# Patient Record
Sex: MALE | Race: WHITE | NOT HISPANIC OR LATINO | Employment: FULL TIME | URBAN - METROPOLITAN AREA
[De-identification: names, ages, dates, MRNs, and addresses within clinical notes are randomized per-mention and may not be internally consistent; named-entity substitution may affect disease eponyms.]

---

## 2019-08-14 ENCOUNTER — TELEPHONE (OUTPATIENT)
Dept: FAMILY MEDICINE CLINIC | Facility: CLINIC | Age: 39
End: 2019-08-14

## 2019-08-14 ENCOUNTER — OFFICE VISIT (OUTPATIENT)
Dept: FAMILY MEDICINE CLINIC | Facility: CLINIC | Age: 39
End: 2019-08-14
Payer: COMMERCIAL

## 2019-08-14 VITALS
OXYGEN SATURATION: 97 % | BODY MASS INDEX: 35.49 KG/M2 | HEIGHT: 76 IN | WEIGHT: 291.4 LBS | HEART RATE: 86 BPM | SYSTOLIC BLOOD PRESSURE: 138 MMHG | TEMPERATURE: 97.1 F | DIASTOLIC BLOOD PRESSURE: 80 MMHG | RESPIRATION RATE: 16 BRPM

## 2019-08-14 DIAGNOSIS — B35.4 TINEA CORPORIS: Primary | ICD-10-CM

## 2019-08-14 PROCEDURE — 99203 OFFICE O/P NEW LOW 30 MIN: CPT | Performed by: PHYSICIAN ASSISTANT

## 2019-08-14 PROCEDURE — 3008F BODY MASS INDEX DOCD: CPT | Performed by: PHYSICIAN ASSISTANT

## 2019-08-14 PROCEDURE — 1036F TOBACCO NON-USER: CPT | Performed by: PHYSICIAN ASSISTANT

## 2019-08-14 RX ORDER — GRISEOFULVIN 500 MG/1
500 TABLET ORAL DAILY
Qty: 14 TABLET | Refills: 0 | Status: SHIPPED | OUTPATIENT
Start: 2019-08-14 | End: 2019-08-28

## 2019-08-14 NOTE — PROGRESS NOTES
Assessment/Plan:    1  Tinea corporis  Discussed treatment options  Patient was agreeable to griseofulvin  Will start with 2 week course twice daily  May need 4 week treatment  Discussed getting a CMP at start of treatment and after  Patient agreeable  Discussed possible side effects  Discussed skin care and proper hygiene  Patient refused all other lab work at this time  - comprehensive metabolic panel; Future  - griseofulvin (GRIFULVIN V) 500 MG tablet; Take 1 tablet (500 mg total) by mouth daily for 14 days  Dispense: 14 tablet; Refill: 0    Addendum:  Patient called in after visit and stated griseofulvin was not stock at local pharmacy  He requested an alternative  We will switch to terbinafine once daily for 4 weeks  Discussed that he still needs to get his lab work done at started treatment and after  Discussed possible side effects  Will recheck in 1 month  Follow up sooner if needed  No problem-specific Assessment & Plan notes found for this encounter  There is no problem list on file for this patient  Subjective:      Patient ID: Terry Cage is a 35 y o  male  Patient is here to establish care  Previously did not have a PCP  He was recently seen at the urgent care for a rash  States that has been present for over 2 weeks  He was seen on 08/07 at [facility]  He was told to use over-the-counter Lotrimin as it was ringworm  He states it did not improve  He requested a oral medication  They have refused and said he should see his PCP  States it started on his neck  It then spread to his arms chest and trunk  Denies contacts with similar symptoms  States it is mildly itchy  Denies pain  Denies pustules, blisters, or weeping  Denies fever  Denies cold or flu-like symptoms  Denies any ongoing medical issues  He does not take medication for anything        The following portions of the patient's history were reviewed and updated as appropriate: allergies, current medications, past family history, past medical history, past social history, past surgical history and problem list     Review of Systems   Constitutional: Negative for appetite change, chills, fatigue and fever  HENT: Negative for congestion, ear pain, postnasal drip, rhinorrhea, sinus pressure, sinus pain and sore throat  Respiratory: Negative for cough, chest tightness, shortness of breath and wheezing  Cardiovascular: Negative for chest pain, palpitations and leg swelling  Gastrointestinal: Negative for abdominal pain, constipation, diarrhea, nausea and vomiting  Endocrine: Negative for cold intolerance, heat intolerance, polydipsia, polyphagia and polyuria  Musculoskeletal: Negative for arthralgias, back pain, gait problem, joint swelling and myalgias  Skin: Positive for rash  Neurological: Negative for dizziness, weakness, light-headedness, numbness and headaches  Hematological: Negative for adenopathy  Objective:      /80 (BP Location: Left arm, Patient Position: Sitting, Cuff Size: Large)   Pulse 86   Temp (!) 97 1 °F (36 2 °C) (Tympanic)   Resp 16   Ht 6' 4" (1 93 m)   Wt 132 kg (291 lb 6 4 oz)   SpO2 97%   BMI 35 47 kg/m²          Physical Exam   Constitutional: He is oriented to person, place, and time  He appears well-developed and well-nourished  HENT:   Head: Normocephalic and atraumatic  Eyes: Pupils are equal, round, and reactive to light  Conjunctivae are normal    Neck: Normal range of motion  Neck supple  Cardiovascular: Normal rate, regular rhythm, S1 normal, S2 normal, normal heart sounds, intact distal pulses and normal pulses  Pulmonary/Chest: Effort normal and breath sounds normal    Abdominal: Soft  Bowel sounds are normal    Musculoskeletal: Normal range of motion  Neurological: He is alert and oriented to person, place, and time  Skin: Skin is warm and dry  Rash noted     Well demarcated circular erythematous plaques with central clearing spread throughout back, trunk, arms, and neck bilateral   Base of hairline and scalp as well  Psychiatric: He has a normal mood and affect  His behavior is normal  Judgment and thought content normal    Nursing note and vitals reviewed  No current outpatient medications on file prior to visit  No current facility-administered medications on file prior to visit

## 2019-08-14 NOTE — TELEPHONE ENCOUNTER
Pt called L/M stating his medication is rare and needs a substitute medication his pharmacy does not have this in stock  Thank you!

## 2019-08-15 NOTE — TELEPHONE ENCOUNTER
The medication is not rare, the pharmacy just does not carry it  He can either call other pharmacies in the area to see who stocks it, or I can switch him to terbenafine  This is still a month long treatment and he still needs to get the labs done as soon as possible   Thank you

## 2019-08-15 NOTE — TELEPHONE ENCOUNTER
I s/w pt and he stated to me he will look around to see if there is another pharmacy that carries griseofulvin 500 mg  He will give us a call back to have it changed if he is unsuccessful  Thank you!

## 2020-01-29 RX ORDER — METRONIDAZOLE 250 MG/1
TABLET ORAL EVERY 8 HOURS SCHEDULED
COMMUNITY

## 2020-01-29 NOTE — PRE-PROCEDURE INSTRUCTIONS
No outpatient medications have been marked as taking for the 1/31/20 encounter [facility] Encounter)

## 2020-01-30 ENCOUNTER — ANESTHESIA EVENT (OUTPATIENT)
Dept: PERIOP | Facility: HOSPITAL | Age: 40
End: 2020-01-30
Payer: COMMERCIAL

## 2020-01-30 NOTE — ANESTHESIA PREPROCEDURE EVALUATION
Review of Systems/Medical History  Patient summary reviewed  Chart reviewed      Cardiovascular  Negative cardio ROS    Pulmonary  Negative pulmonary ROS        GI/Hepatic  Negative GI/hepatic ROS          Negative  ROS        Endo/Other  Negative endo/other ROS      GYN       Hematology  Negative hematology ROS      Musculoskeletal  Negative musculoskeletal ROS        Neurology  Negative neurology ROS      Psychology   Negative psychology ROS              Physical Exam    Airway    Mallampati score: II  TM Distance: >3 FB  Neck ROM: full     Dental       Cardiovascular  Comment: Negative ROS, Cardiovascular exam normal    Pulmonary  Pulmonary exam normal     Other Findings        Anesthesia Plan  ASA Score- 1     Anesthesia Type- IV sedation with anesthesia and general with ASA Monitors  Additional Monitors:   Airway Plan: ETT  Plan Factors-    Induction- intravenous  Postoperative Plan-     Informed Consent- Anesthetic plan and risks discussed with patient  I personally reviewed this patient with the CRNA  Discussed and agreed on the Anesthesia Plan with the CRNA  Keren Carlson

## 2020-01-31 ENCOUNTER — ANESTHESIA (OUTPATIENT)
Dept: PERIOP | Facility: HOSPITAL | Age: 40
End: 2020-01-31
Payer: COMMERCIAL

## 2020-01-31 ENCOUNTER — HOSPITAL ENCOUNTER (OUTPATIENT)
Facility: HOSPITAL | Age: 40
Setting detail: OUTPATIENT SURGERY
Discharge: HOME | End: 2020-01-31
Attending: COLON & RECTAL SURGERY | Admitting: COLON & RECTAL SURGERY
Payer: COMMERCIAL

## 2020-01-31 VITALS
RESPIRATION RATE: 16 BRPM | SYSTOLIC BLOOD PRESSURE: 128 MMHG | HEART RATE: 85 BPM | TEMPERATURE: 97.5 F | DIASTOLIC BLOOD PRESSURE: 93 MMHG | OXYGEN SATURATION: 94 %

## 2020-01-31 DIAGNOSIS — K61.0 ANAL ABSCESS: Primary | ICD-10-CM

## 2020-01-31 RX ORDER — SODIUM CHLORIDE, SODIUM LACTATE, POTASSIUM CHLORIDE, CALCIUM CHLORIDE 600; 310; 30; 20 MG/100ML; MG/100ML; MG/100ML; MG/100ML
125 INJECTION, SOLUTION INTRAVENOUS CONTINUOUS
Status: DISCONTINUED | OUTPATIENT
Start: 2020-01-31 | End: 2020-01-31 | Stop reason: HOSPADM

## 2020-01-31 RX ORDER — SUCCINYLCHOLINE/SOD CL,ISO/PF 100 MG/5ML
SYRINGE (ML) INTRAVENOUS AS NEEDED
Status: DISCONTINUED | OUTPATIENT
Start: 2020-01-31 | End: 2020-01-31 | Stop reason: SURG

## 2020-01-31 RX ORDER — MAGNESIUM HYDROXIDE 1200 MG/15ML
LIQUID ORAL AS NEEDED
Status: DISCONTINUED | OUTPATIENT
Start: 2020-01-31 | End: 2020-01-31 | Stop reason: HOSPADM

## 2020-01-31 RX ORDER — FENTANYL CITRATE/PF 50 MCG/ML
25 SYRINGE (ML) INJECTION
Status: DISCONTINUED | OUTPATIENT
Start: 2020-01-31 | End: 2020-01-31 | Stop reason: HOSPADM

## 2020-01-31 RX ORDER — DIPHENHYDRAMINE HYDROCHLORIDE 50 MG/ML
12.5 INJECTION INTRAMUSCULAR; INTRAVENOUS ONCE AS NEEDED
Status: DISCONTINUED | OUTPATIENT
Start: 2020-01-31 | End: 2020-01-31 | Stop reason: HOSPADM

## 2020-01-31 RX ORDER — HYDROMORPHONE HCL/PF 1 MG/ML
0.5 SYRINGE (ML) INJECTION
Status: DISCONTINUED | OUTPATIENT
Start: 2020-01-31 | End: 2020-01-31 | Stop reason: HOSPADM

## 2020-01-31 RX ORDER — OXYCODONE HYDROCHLORIDE AND ACETAMINOPHEN 5; 325 MG/1; MG/1
1 TABLET ORAL EVERY 4 HOURS PRN
Status: DISCONTINUED | OUTPATIENT
Start: 2020-01-31 | End: 2020-01-31 | Stop reason: HOSPADM

## 2020-01-31 RX ORDER — FENTANYL CITRATE 50 UG/ML
INJECTION, SOLUTION INTRAMUSCULAR; INTRAVENOUS AS NEEDED
Status: DISCONTINUED | OUTPATIENT
Start: 2020-01-31 | End: 2020-01-31 | Stop reason: SURG

## 2020-01-31 RX ORDER — LIDOCAINE HYDROCHLORIDE 10 MG/ML
INJECTION, SOLUTION EPIDURAL; INFILTRATION; INTRACAUDAL; PERINEURAL AS NEEDED
Status: DISCONTINUED | OUTPATIENT
Start: 2020-01-31 | End: 2020-01-31 | Stop reason: SURG

## 2020-01-31 RX ORDER — PROPOFOL 10 MG/ML
INJECTION, EMULSION INTRAVENOUS AS NEEDED
Status: DISCONTINUED | OUTPATIENT
Start: 2020-01-31 | End: 2020-01-31 | Stop reason: SURG

## 2020-01-31 RX ORDER — OXYCODONE HYDROCHLORIDE 5 MG/1
5 TABLET ORAL EVERY 4 HOURS PRN
Qty: 30 TABLET | Refills: 0 | Status: SHIPPED | OUTPATIENT
Start: 2020-01-31 | End: 2020-02-10

## 2020-01-31 RX ADMIN — SODIUM CHLORIDE, SODIUM LACTATE, POTASSIUM CHLORIDE, AND CALCIUM CHLORIDE: .6; .31; .03; .02 INJECTION, SOLUTION INTRAVENOUS at 12:20

## 2020-01-31 RX ADMIN — PROPOFOL 250 MG: 10 INJECTION, EMULSION INTRAVENOUS at 12:25

## 2020-01-31 RX ADMIN — Medication 140 MG: at 12:25

## 2020-01-31 RX ADMIN — LIDOCAINE HYDROCHLORIDE 50 MG: 10 INJECTION, SOLUTION EPIDURAL; INFILTRATION; INTRACAUDAL; PERINEURAL at 12:25

## 2020-01-31 RX ADMIN — PROPOFOL 50 MG: 10 INJECTION, EMULSION INTRAVENOUS at 12:45

## 2020-01-31 RX ADMIN — FENTANYL CITRATE 50 MCG: 50 INJECTION INTRAMUSCULAR; INTRAVENOUS at 12:25

## 2020-01-31 RX ADMIN — FENTANYL CITRATE 50 MCG: 50 INJECTION INTRAMUSCULAR; INTRAVENOUS at 12:23

## 2020-01-31 NOTE — PERIOPERATIVE NURSING NOTE
Returned from [facility] awake and alert c/o slight pain  ivs running  Sipping water  Dressing dry and intact

## 2020-01-31 NOTE — OP NOTE
OPERATIVE REPORT  PATIENT NAME: Sury Olguin    :  1980  MRN: [id number]  Pt Location:  OR ROOM 08    SURGERY DATE: 2020    Preoperative Dx:  History of anal abscess    Postoperative Dx:  Same    Procedure:  Exam under anesthesia, incision and drainage of intersphincteric abscess  Surgeon: Dr Fernandez Rice MD    First Assistant: None    Findings:  Intersphincteric abscess in the left anterior position    Specimen(s): * No specimens in log *    Anesthesia Type: General    EBL: Minimal    Complications: None      Procedure: The patient was brought into the operating room  General anesthesia was administered  He was transferred onto the table in a prone nubia-knife position  The buttocks were retracted with cloth tape  The area was prepped and draped  The patient was given an anal block with lidocaine Marcaine epinephrine and bicarbonate  I placed a Sorenson retractors  The abscess previously was not clearly visualized but there was pus draining from the left anterior position  On feeling here with my finger I could feel induration in the left anterior position  I took a crypt hook placing it in the crypts identifying a crypt in the left anterior position entering into a small abscess cavity space  I used electrocautery to cut down through the mucosa and the internal sphincter muscle getting to the small abscess cavity  There was granulation tissue  I used a curette to curette this area  The entire wound probably measured 1 cm in size only as we entered into the space  At the end of the case there was good hemostasis  I felt that the abscess was adequately drained  A small piece of Surgicel was placed  The patient was woken transported recovery room in stable condition                Attestation: I was present for the entire procedure        Patient Disposition: PACU      SIGNATURE: Fernandez Rice MD  DATE: 2020  TIME: 12:51 PM

## 2020-01-31 NOTE — DISCHARGE SUMMARY
[facility]                                                                                 DISCHARGE SUMMARY        PATIENT NAME: Tiff Castro    :  1980  MRN: [id number]  Pt Location: [facility] OR ROOM 08    DISCHARGE DATE: 2020    PROCEDURE: Procedure(s) (LRB):  DRAINAGE OF INTERSPHINTERIC ABCESS (N/A)    Anesthesia Type: General    Complications: None    Specimen(s):  * No specimens in log *    HOSPITAL COURSE: The patient was admitted for elective procedure  The procedure was uncomplicated and the the patient was discharged home post procedure          SIGNATURE: Gaston Moya MD  DATE: 2020  TIME: 12:56 PM

## 2020-01-31 NOTE — INTERVAL H&P NOTE
H&P reviewed  After examining the patient I find no changes in the patients condition since the H&P had been written      Vitals:    01/31/20 1048   BP: 170/91   Pulse: 87   Resp: 16   Temp: 98 °F (36 7 °C)   SpO2: 98%

## 2021-04-14 ENCOUNTER — OFFICE VISIT (OUTPATIENT)
Dept: PODIATRY | Facility: CLINIC | Age: 41
End: 2021-04-14
Payer: COMMERCIAL

## 2021-04-14 VITALS
DIASTOLIC BLOOD PRESSURE: 88 MMHG | HEIGHT: 77 IN | WEIGHT: 308.6 LBS | SYSTOLIC BLOOD PRESSURE: 130 MMHG | BODY MASS INDEX: 36.44 KG/M2

## 2021-04-14 DIAGNOSIS — M72.2 PLANTAR FASCIITIS: ICD-10-CM

## 2021-04-14 DIAGNOSIS — M77.42 METATARSALGIA OF LEFT FOOT: ICD-10-CM

## 2021-04-14 DIAGNOSIS — M77.41 METATARSALGIA OF RIGHT FOOT: Primary | ICD-10-CM

## 2021-04-14 PROCEDURE — 3008F BODY MASS INDEX DOCD: CPT | Performed by: PODIATRIST

## 2021-04-14 PROCEDURE — 99202 OFFICE O/P NEW SF 15 MIN: CPT | Performed by: PODIATRIST

## 2021-04-14 PROCEDURE — 1036F TOBACCO NON-USER: CPT | Performed by: PODIATRIST

## 2021-04-14 RX ORDER — MELOXICAM 15 MG/1
15 TABLET ORAL DAILY
Qty: 30 TABLET | Refills: 0 | Status: SHIPPED | OUTPATIENT
Start: 2021-04-14 | End: 2021-05-14

## 2021-04-15 NOTE — PROGRESS NOTES
Assessment/Plan:    Explained the patient that he is dealing with metatarsalgia bilateral along with plantar fasciitis of the left arch secondary to overuse  Patient wears a size 13 shoe an over-the-counter supports are not advised  Also did not recommended orthotics due to his past failure of inserts in his shoes  Patient placed on meloxicam 15 mg daily  He will be rescheduled in 4 weeks  No problem-specific Assessment & Plan notes found for this encounter  Diagnoses and all orders for this visit:    Metatarsalgia of right foot  -     meloxicam (MOBIC) 15 mg tablet; Take 1 tablet (15 mg total) by mouth daily    Metatarsalgia of left foot  -     meloxicam (MOBIC) 15 mg tablet; Take 1 tablet (15 mg total) by mouth daily    Plantar fasciitis  -     meloxicam (MOBIC) 15 mg tablet; Take 1 tablet (15 mg total) by mouth daily          Subjective:      Patient ID: Mehdi Kelly is a 42 y o  male  HPI     Patient, a 26-year-old male presents with bilateral foot pain  Patient states that he has been in pain for over 1 year  The pain increases the more that the patient is standing or working  He states that he is a  and is on his feet throughout most of the day  Patient does not have significant pain when he is nonweightbearing  The pain is primarily in the balls of both feet and in the left arch  He states that his toes also feel numb  He has no difficulty sleeping however  No specific trauma is noted  He states that if he takes 2 Aleve he feels more comfortable  He states that years back he had inserts prescribed for his shoes but that they were uncomfortable  He relates having had plantar fasciitis which caused him heel pain in the past   There is no heel pain today      The following portions of the patient's history were reviewed and updated as appropriate: allergies, current medications, past family history, past medical history, past social history, past surgical history and problem list     Review of Systems   Constitutional: Negative  Respiratory: Negative  Cardiovascular: Negative  Gastrointestinal: Negative  Neurological: Positive for numbness  Objective:      /88   Ht 6' 5" (1 956 m)   Wt (!) 140 kg (308 lb 9 6 oz)   BMI 36 59 kg/m²          Physical Exam  Constitutional:       Appearance: Normal appearance  Cardiovascular:      Pulses: Normal pulses  Musculoskeletal:         General: No swelling, tenderness or deformity  Comments: No pain with palpation of either heel  No pain with palpation lesser metatarsals  No evidence of significant pes planus or pes cavus  Skin:     General: Skin is warm  Neurological:      General: No focal deficit present  Mental Status: He is oriented to person, place, and time        Comments: No pain with palpation 2nd and 3rd metatarsal interspaces bilateral

## 2021-05-12 ENCOUNTER — OFFICE VISIT (OUTPATIENT)
Dept: PODIATRY | Facility: CLINIC | Age: 41
End: 2021-05-12
Payer: COMMERCIAL

## 2021-05-12 VITALS
BODY MASS INDEX: 36.11 KG/M2 | DIASTOLIC BLOOD PRESSURE: 72 MMHG | HEIGHT: 77 IN | SYSTOLIC BLOOD PRESSURE: 120 MMHG | WEIGHT: 305.8 LBS

## 2021-05-12 DIAGNOSIS — M77.42 METATARSALGIA OF LEFT FOOT: ICD-10-CM

## 2021-05-12 DIAGNOSIS — M77.41 METATARSALGIA OF RIGHT FOOT: Primary | ICD-10-CM

## 2021-05-12 PROCEDURE — 99212 OFFICE O/P EST SF 10 MIN: CPT | Performed by: PODIATRIST

## 2021-05-12 PROCEDURE — 3008F BODY MASS INDEX DOCD: CPT | Performed by: PODIATRIST

## 2021-05-12 PROCEDURE — 1036F TOBACCO NON-USER: CPT | Performed by: PODIATRIST

## 2021-05-12 RX ORDER — MELOXICAM 15 MG/1
15 TABLET ORAL DAILY
Qty: 90 TABLET | Refills: 0 | Status: SHIPPED | OUTPATIENT
Start: 2021-05-12 | End: 2021-08-10

## 2021-05-12 NOTE — PROGRESS NOTES
Patient presents for pedal assessment  He has been taking meloxicam for the past 1 month without adverse effects  Patient notes improvement in both feet although he still has discomfort  He desires though to continue with this medication  A 90 day supply was provided  Patient will be rescheduled in 3 months

## 2021-06-23 ENCOUNTER — OFFICE VISIT (OUTPATIENT)
Dept: URGENT CARE | Facility: CLINIC | Age: 41
End: 2021-06-23
Payer: COMMERCIAL

## 2021-06-23 VITALS
TEMPERATURE: 97.2 F | RESPIRATION RATE: 18 BRPM | DIASTOLIC BLOOD PRESSURE: 100 MMHG | SYSTOLIC BLOOD PRESSURE: 153 MMHG | OXYGEN SATURATION: 98 % | HEART RATE: 90 BPM
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
## 2021-06-23 DIAGNOSIS — S80.862A INSECT BITE OF LEFT LOWER LEG, INITIAL ENCOUNTER: Primary | ICD-10-CM

## 2021-06-23 DIAGNOSIS — L03.116 CELLULITIS OF LEFT LOWER LEG: ICD-10-CM

## 2021-06-23 PROCEDURE — G0382 LEV 3 HOSP TYPE B ED VISIT: HCPCS | Performed by: PHYSICIAN ASSISTANT

## 2021-06-23 RX ORDER — CEPHALEXIN 500 MG/1
500 CAPSULE ORAL EVERY 8 HOURS SCHEDULED
Qty: 21 CAPSULE | Refills: 0 | Status: SHIPPED | COMMUNITY
Start: 2021-06-23 | End: 2021-06-30

## 2021-06-23 RX ORDER — CEPHALEXIN 500 MG/1
500 CAPSULE ORAL EVERY 8 HOURS SCHEDULED
Qty: 21 CAPSULE | Refills: 0 | Status: SHIPPED | OUTPATIENT
Start: 2021-06-23 | End: 2021-06-23 | Stop reason: CLARIF

## 2021-06-23 NOTE — PATIENT INSTRUCTIONS
Take photos of lesions when you get home to document  Clean areas daily with soap and water  Do not use alcohol or peroxide  Take antibiotic as instructed  If not improving over the next 3-5 days would recommend further evaluation with your primary care provider

## 2021-06-23 NOTE — PROGRESS NOTES
[facility]    NAME: Rafael Wells is a 42 y o  male  : 1980    MRN: [id number]  DATE: 2021  TIME: 5:55 PM    Assessment and Plan   Insect bite of left lower leg, initial encounter [H29 958D, W57  [id number]]  1  Insect bite of left lower leg, initial encounter     2  Cellulitis of left lower leg  cephalexin (KEFLEX) 500 mg capsule    DISCONTINUED: cephalexin (KEFLEX) 500 mg capsule       Patient Instructions   Patient Instructions   Take photos of lesions when you get home to document  Clean areas daily with soap and water  Do not use alcohol or peroxide  Take antibiotic as instructed  If not improving over the next 3-5 days would recommend further evaluation with your primary care provider  Chief Complaint     Chief Complaint   Patient presents with    Skin Problem     Left lower leg with 2 bites and redness and swelling for 2 days       History of Present Illness   Rafael Wells presents to the clinic c/o    25-year-old male that notice some sore areas on his lower leg about 3 days ago  They came to a head and he scrubbed them and squeezed at them and they are becoming more painful  Unaware of any specific bug bites but they do appear to be bug bites  Denies any known tick exposure  No fever or chills  Review of Systems   Review of Systems   Constitutional: Negative  Skin: Positive for color change and wound         Current Medications     Long-Term Medications   Medication Sig Dispense Refill    meloxicam (MOBIC) 15 mg tablet Take 1 tablet (15 mg total) by mouth daily 90 tablet 0    meloxicam (MOBIC) 15 mg tablet Take 1 tablet (15 mg total) by mouth daily 30 tablet 0       Current Allergies     Allergies as of 2021    (No Known Allergies)          The following portions of the patient's history were reviewed and updated as appropriate: allergies, current medications, past family history, past medical history, past social history, past surgical history and problem list   Past Medical History:   Diagnosis Date    Perianal abscess      Past Surgical History:   Procedure Laterality Date    FACIAL RECONSTRUCTION SURGERY      NO PAST SURGERIES      NH REMOVAL ANAL FISTULA,SUBCUTANEOUS N/A 1/31/2020    Procedure: DRAINAGE OF INTERSPHINTERIC ABCESS;  Surgeon: Kimani Tamayo MD;  Location: [facility] MAIN OR;  Service: Colorectal     Family History   Problem Relation Age of Onset    Diabetes Mother     Hypertension Father        Objective   /100   Pulse 90   Temp (!) 97 2 °F (36 2 °C) (Tympanic)   Resp 18   SpO2 98%   No LMP for male patient  Physical Exam     Physical Exam  Vitals and nursing note reviewed  Constitutional:       General: He is not in acute distress  Appearance: Normal appearance  He is well-developed  He is not ill-appearing, toxic-appearing or diaphoretic  Cardiovascular:      Rate and Rhythm: Normal rate and regular rhythm  Pulmonary:      Effort: Pulmonary effort is normal    Skin:     General: Skin is warm and dry  Comments: Left lower leg in gator area with 2 distinct red swollen indurated lesions with central ulceration appear consistent with some type of insect bite  Localized TTP  1 lesion is 3 cm in diameter while the other 1 is approximately 2 cm in diameter  Neurological:      Mental Status: He is alert and oriented to person, place, and time     Psychiatric:         Mood and Affect: Mood normal          Behavior: Behavior normal

## 2021-10-20 ENCOUNTER — TELEPHONE (OUTPATIENT)
Dept: FAMILY MEDICINE CLINIC | Facility: CLINIC | Age: 41
End: 2021-10-20

## 2021-10-20 NOTE — TELEPHONE ENCOUNTER
Pt's wife LM stating last week he had a broken blood vessel in his eye which then cleared up but this morning woke up with broken blood vessel in the opposite eye  She would like to know if he should schedule an appt in our office or with an eye doctor

## 2021-10-20 NOTE — TELEPHONE ENCOUNTER
Lmom with Dr Kobe Kirkpatrick message about scheduling with opthomalogy  Asked wife to call back to confirm she got message and to cancel appt for thurs 10/21 at 8am

## 2022-11-12 ENCOUNTER — TELEPHONE (OUTPATIENT)
Dept: OTHER | Facility: OTHER | Age: 42
End: 2022-11-12

## 2022-11-12 NOTE — TELEPHONE ENCOUNTER
Call from patient wife, Neelima Ellis, requesting call back for appointment today as patient is feeling stressful

## 2022-11-16 ENCOUNTER — OFFICE VISIT (OUTPATIENT)
Dept: FAMILY MEDICINE CLINIC | Facility: CLINIC | Age: 42
End: 2022-11-16

## 2022-11-16 VITALS
DIASTOLIC BLOOD PRESSURE: 100 MMHG | TEMPERATURE: 99 F | HEART RATE: 88 BPM | WEIGHT: 289 LBS | BODY MASS INDEX: 35.93 KG/M2 | HEIGHT: 75 IN | SYSTOLIC BLOOD PRESSURE: 146 MMHG | OXYGEN SATURATION: 98 %

## 2022-11-16 DIAGNOSIS — Z13.220 SCREENING FOR LIPID DISORDERS: ICD-10-CM

## 2022-11-16 DIAGNOSIS — F41.1 ANXIETY IN ACUTE STRESS REACTION: Primary | ICD-10-CM

## 2022-11-16 DIAGNOSIS — R03.0 ELEVATED BLOOD PRESSURE READING: ICD-10-CM

## 2022-11-16 DIAGNOSIS — K42.9 UMBILICAL HERNIA WITHOUT OBSTRUCTION AND WITHOUT GANGRENE: ICD-10-CM

## 2022-11-16 DIAGNOSIS — G62.9 NEUROPATHY: ICD-10-CM

## 2022-11-16 DIAGNOSIS — Z11.59 NEED FOR HEPATITIS C SCREENING TEST: ICD-10-CM

## 2022-11-16 DIAGNOSIS — F43.0 ANXIETY IN ACUTE STRESS REACTION: Primary | ICD-10-CM

## 2022-11-16 DIAGNOSIS — Z13.31 POSITIVE DEPRESSION SCREENING: ICD-10-CM

## 2022-11-16 DIAGNOSIS — Z11.4 SCREENING FOR HIV (HUMAN IMMUNODEFICIENCY VIRUS): ICD-10-CM

## 2022-11-16 DIAGNOSIS — Z13.1 SCREENING FOR DIABETES MELLITUS: ICD-10-CM

## 2022-11-16 DIAGNOSIS — R82.998 DARK URINE: ICD-10-CM

## 2022-11-16 DIAGNOSIS — Z23 NEED FOR VACCINATION: ICD-10-CM

## 2022-11-16 PROBLEM — A69.23 LYME ARTHRITIS (HCC): Status: ACTIVE | Noted: 2022-11-06

## 2022-11-16 NOTE — PATIENT INSTRUCTIONS
Purchase BP cuff (large size)  Check your daily blood pressure and keep a log of the readings for the next 2 weeks  Follow up in the office in 2 weeks

## 2022-11-16 NOTE — PROGRESS NOTES
Assessment/Plan:    1  Anxiety in acute stress reaction  Assessment & Plan:  Patient with acute anxiety due to stress regarding his job  Addressed self-care methods for lessening anxiety such as decreased caffeine intake, healthy diet, stress management, and relaxation techniques  Patient was counseled briefly  He has support from his spouse  Medication option was discussed which patient deferred at this time  Patient will follow up in 2 weeks  2  Positive depression screening  Assessment & Plan:  Depression Screening Follow-up Plan: Patient's depression screening was positive with a PHQ-2 score of 4  Their PHQ-9 score was 13  Patient assessed for underlying major depression  They have no active suicidal ideations  Brief counseling provided and recommend additional follow-up/re-evaluation next office visit  3  Neuropathy  Assessment & Plan:  Patient with decreased sensation in multiple areas of feet with monofilament test   We discussed possible causes and will obtain blood work for further evaluation  Orders:  -     CBC and differential; Future; Expected date: 11/16/2022  -     Comprehensive metabolic panel; Future; Expected date: 11/16/2022  -     Hemoglobin A1C; Future; Expected date: 11/16/2022  -     Vitamin B12; Future; Expected date: 11/16/2022    4  Elevated blood pressure reading  Assessment & Plan:  Patient with elevated BP without history of HTN  We discussed importance of BP control and lifestyle modifications including weight loss, diet change and exercise  Patient instructed to purchase a blood pressure cuff and start checking daily BP readings  He was instructed to follow up in the office in 2 weeks to recheck BP  Obtain blood work which was ordered  Orders:  -     CBC and differential; Future; Expected date: 11/16/2022  -     Comprehensive metabolic panel; Future; Expected date: 11/16/2022  -     TSH, 3rd generation with Free T4 reflex;  Future; Expected date: 11/16/2022    5  Dark urine  Assessment & Plan:  Patient with episode of dark urine  No other urinary symptoms  Patient likely dehydrated and instructed to increase water intake  However, we will rule out UTI  UA ordered  Orders:  -     UA (URINE) with reflex to Scope    6  Umbilical hernia without obstruction and without gangrene  Assessment & Plan:  Patient with umbilical hernia that is not causing discomfort  Hernia is reducible on exam     We discussed treatment options and patient agrees to defer management at this time  Consider referral to surgery if patient develops pain  We discussed signs and symptoms of hernia obstruction and strangulation  7  Need for hepatitis C screening test  -     Hepatitis C Antibody ([facility]); Future    8  Screening for lipid disorders  -     Lipid Panel with Direct LDL reflex; Future; Expected date: 11/16/2022    9  Screening for diabetes mellitus  -     Hemoglobin A1C; Future; Expected date: 11/16/2022    10  Screening for HIV (human immunodeficiency virus)  -     HIV 1/2 Antigen/Antibody (4th Generation) w Reflex [facility]; Future; Expected date: 11/16/2022    11  Need for vaccination  -     TDAP VACCINE GREATER THAN OR EQUAL TO 8YO IM    BMI Counseling: Body mass index is 36 12 kg/m²  The BMI is above normal  Nutrition recommendations include reducing portion sizes, 3-5 servings of fruits/vegetables daily, consuming healthier snacks, decreasing soda and/or juice intake and moderation in carbohydrate intake  Exercise recommendations include moderate aerobic physical activity for 150 minutes/week and exercising 3-5 times per week  Depression Screening Follow-up Plan: Patient's depression screening was positive with a PHQ-2 score of 4  Their PHQ-9 score was 13  Patient assessed for underlying major depression  They have no active suicidal ideations  Brief counseling provided and recommend additional follow-up/re-evaluation next office visit    Subjective: Patient ID: Trey Kilgore is a 36 y o  male  HPI    Patient presenting to establish care  He is here with his wife today and has multiple concerns  Patient has numbness in the bottom of both of his feet that has been rpesent for several years  He feels there is numbness in his toes  By the end of the day he has to put his feet up after work due to discomfort in his feet  He works as a  and is on his feet all day  Patient had an episode of dark urine today  Patient denies urinary symptoms, nausea, vomiting, chest pain, shortness of breath  Patient eats three meals per day and snacks late at night  He eats chips, nachos, juice, ice cream   This morning he had a half a bagel, a small bag of doritos, spaghetti and meat sauce for lunch, and has not eaten dinner yet  He admits that he drinks a lot of juice and soda  His wife cooks most of the meals at home  Patient has an umbilical hernia that has been present for several yaers  He has occasional pain in the area but mild and it does not occur frequently  Patient lives with his wife and two children, ages 15 and 16  He also has a pet dog  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 and he has been anxious lately  His wife has noticed decreased appetite and low mood  He found out about it last Monday  He has been at this company for 8 years and feels that he has to leave the company  He already is applying to a different job and has interview next week  Patient's wife has noticed him not sleeping well  He told her that he feels like he is a failure  Patient denies history of depression and anxiety  He is usually in a good mood most days  This is a new emotion for him to experience and he understands that it is due to this recent stress regarding his job  He does not wish to start medication for his mood at this time  Patient was recently diagnosed with lyme arthritis and was given 21 days of doxycucline         The following portions of the patient's history were reviewed and updated as appropriate: allergies, current medications, past family history, past medical history, past social history, past surgical history, and problem list     No current outpatient medications on file  Review of Systems   Constitutional: Negative for chills and fever  HENT: Negative for ear pain and sore throat  Eyes: Negative for pain and visual disturbance  Respiratory: Negative for cough and shortness of breath  Cardiovascular: Negative for chest pain and palpitations  Gastrointestinal: Negative for abdominal pain and vomiting  Genitourinary: Negative for difficulty urinating, dysuria, flank pain, frequency, hematuria and urgency  Musculoskeletal: Negative for arthralgias and back pain  Skin: Negative for color change and rash  Neurological: Positive for numbness (feet)  Negative for seizures and syncope  Psychiatric/Behavioral: Positive for dysphoric mood and sleep disturbance  Negative for agitation, behavioral problems, confusion, self-injury and suicidal ideas  The patient is nervous/anxious  All other systems reviewed and are negative  Objective:      /100 (BP Location: Left arm, Patient Position: Sitting, Cuff Size: Large)   Pulse 88   Temp 99 °F (37 2 °C) (Temporal)   Ht 6' 3" (1 905 m)   Wt 131 kg (289 lb)   SpO2 98%   BMI 36 12 kg/m²          Physical Exam  Vitals and nursing note reviewed  Constitutional:       General: He is not in acute distress  Appearance: Normal appearance  He is obese  He is not ill-appearing or toxic-appearing  HENT:      Head: Normocephalic and atraumatic  Right Ear: External ear normal  There is no impacted cerumen  Left Ear: External ear normal  There is no impacted cerumen  Nose: Nose normal       Mouth/Throat:      Mouth: Mucous membranes are moist       Pharynx: Oropharynx is clear  No oropharyngeal exudate     Eyes:      Extraocular Movements: Extraocular movements intact  Conjunctiva/sclera: Conjunctivae normal    Neck:      Thyroid: No thyromegaly  Vascular: No carotid bruit  Cardiovascular:      Rate and Rhythm: Normal rate and regular rhythm  Heart sounds: Normal heart sounds  No murmur heard  Pulmonary:      Effort: Pulmonary effort is normal  No accessory muscle usage or respiratory distress  Breath sounds: Normal breath sounds and air entry  Abdominal:      General: Bowel sounds are normal  There is no distension  Palpations: Abdomen is soft  There is no mass  Tenderness: There is no abdominal tenderness  Hernia: A hernia is present  Hernia is present in the umbilical area  Musculoskeletal:         General: Normal range of motion  Cervical back: Full passive range of motion without pain and normal range of motion  Right lower leg: No edema  Left lower leg: No edema  Right foot: No deformity  Left foot: No deformity  Feet:      Right foot:      Protective Sensation: 10 sites tested  8 sites sensed  Skin integrity: Skin integrity normal       Toenail Condition: Right toenails are normal       Left foot:      Protective Sensation: 10 sites tested  8 sites sensed  Skin integrity: Skin integrity normal       Toenail Condition: Left toenails are normal    Lymphadenopathy:      Cervical: No cervical adenopathy  Skin:     General: Skin is warm and dry  Neurological:      General: No focal deficit present  Mental Status: He is alert and oriented to person, place, and time  Psychiatric:         Mood and Affect: Mood normal          Behavior: Behavior normal          Thought Content:  Thought content normal

## 2022-11-17 NOTE — ASSESSMENT & PLAN NOTE
Patient with elevated BP without history of HTN  We discussed importance of BP control and lifestyle modifications including weight loss, diet change and exercise  Patient instructed to purchase a blood pressure cuff and start checking daily BP readings  He was instructed to follow up in the office in 2 weeks to recheck BP  Obtain blood work which was ordered

## 2022-11-17 NOTE — ASSESSMENT & PLAN NOTE
Patient with decreased sensation in multiple areas of feet with monofilament test   We discussed possible causes and will obtain blood work for further evaluation

## 2022-11-17 NOTE — ASSESSMENT & PLAN NOTE
Patient with episode of dark urine  No other urinary symptoms  Patient likely dehydrated and instructed to increase water intake  However, we will rule out UTI  UA ordered

## 2022-11-17 NOTE — ASSESSMENT & PLAN NOTE
Patient with umbilical hernia that is not causing discomfort  Hernia is reducible on exam     We discussed treatment options and patient agrees to defer management at this time  Consider referral to surgery if patient develops pain  We discussed signs and symptoms of hernia obstruction and strangulation

## 2022-11-17 NOTE — ASSESSMENT & PLAN NOTE
Patient with acute anxiety due to stress regarding his job  Addressed self-care methods for lessening anxiety such as decreased caffeine intake, healthy diet, stress management, and relaxation techniques  Patient was counseled briefly  He has support from his spouse  Medication option was discussed which patient deferred at this time  Patient will follow up in 2 weeks

## 2022-11-17 NOTE — ASSESSMENT & PLAN NOTE
Depression Screening Follow-up Plan: Patient's depression screening was positive with a PHQ-2 score of 4  Their PHQ-9 score was 13  Patient assessed for underlying major depression  They have no active suicidal ideations  Brief counseling provided and recommend additional follow-up/re-evaluation next office visit

## 2022-11-19 ENCOUNTER — APPOINTMENT (OUTPATIENT)
Dept: LAB | Facility: CLINIC | Age: 42
End: 2022-11-19

## 2022-11-19 DIAGNOSIS — G62.9 NEUROPATHY: ICD-10-CM

## 2022-11-19 DIAGNOSIS — R03.0 ELEVATED BLOOD PRESSURE READING: ICD-10-CM

## 2022-11-19 DIAGNOSIS — Z13.220 SCREENING FOR LIPID DISORDERS: ICD-10-CM

## 2022-11-19 DIAGNOSIS — Z11.4 SCREENING FOR HIV (HUMAN IMMUNODEFICIENCY VIRUS): ICD-10-CM

## 2022-11-19 DIAGNOSIS — Z11.59 NEED FOR HEPATITIS C SCREENING TEST: ICD-10-CM

## 2022-11-19 DIAGNOSIS — Z13.1 SCREENING FOR DIABETES MELLITUS: ICD-10-CM

## 2022-11-19 LAB
ALBUMIN SERPL BCP-MCNC: 3.8 G/DL (ref 3.5–5)
ALP SERPL-CCNC: 65 U/L (ref 46–116)
ALT SERPL W P-5'-P-CCNC: 34 U/L (ref 12–78)
ANION GAP SERPL CALCULATED.3IONS-SCNC: 5 MMOL/L (ref 4–13)
AST SERPL W P-5'-P-CCNC: 18 U/L (ref 5–45)
BACTERIA UR QL AUTO: ABNORMAL /HPF
BASOPHILS # BLD AUTO: 0.02 THOUSANDS/ÂΜL (ref 0–0.1)
BASOPHILS NFR BLD AUTO: 0 % (ref 0–1)
BILIRUB SERPL-MCNC: 0.91 MG/DL (ref 0.2–1)
BILIRUB UR QL STRIP: NEGATIVE
BUN SERPL-MCNC: 14 MG/DL (ref 5–25)
CALCIUM SERPL-MCNC: 9.4 MG/DL (ref 8.3–10.1)
CHLORIDE SERPL-SCNC: 108 MMOL/L (ref 96–108)
CHOLEST SERPL-MCNC: 90 MG/DL
CLARITY UR: CLEAR
CO2 SERPL-SCNC: 27 MMOL/L (ref 21–32)
COLOR UR: YELLOW
CREAT SERPL-MCNC: 0.93 MG/DL (ref 0.6–1.3)
EOSINOPHIL # BLD AUTO: 0.06 THOUSAND/ÂΜL (ref 0–0.61)
EOSINOPHIL NFR BLD AUTO: 1 % (ref 0–6)
ERYTHROCYTE [DISTWIDTH] IN BLOOD BY AUTOMATED COUNT: 12.7 % (ref 11.6–15.1)
EST. AVERAGE GLUCOSE BLD GHB EST-MCNC: 105 MG/DL
GFR SERPL CREATININE-BSD FRML MDRD: 100 ML/MIN/1.73SQ M
GLUCOSE P FAST SERPL-MCNC: 91 MG/DL (ref 65–99)
GLUCOSE UR STRIP-MCNC: NEGATIVE MG/DL
HBA1C MFR BLD: 5.3 %
HCT VFR BLD AUTO: 50.2 % (ref 36.5–49.3)
HCV AB SER QL: NORMAL
HDLC SERPL-MCNC: 40 MG/DL
HGB BLD-MCNC: 16.1 G/DL (ref 12–17)
HGB UR QL STRIP.AUTO: NEGATIVE
IMM GRANULOCYTES # BLD AUTO: 0.01 THOUSAND/UL (ref 0–0.2)
IMM GRANULOCYTES NFR BLD AUTO: 0 % (ref 0–2)
KETONES UR STRIP-MCNC: ABNORMAL MG/DL
LDLC SERPL CALC-MCNC: 42 MG/DL (ref 0–100)
LEUKOCYTE ESTERASE UR QL STRIP: NEGATIVE
LYMPHOCYTES # BLD AUTO: 1.05 THOUSANDS/ÂΜL (ref 0.6–4.47)
LYMPHOCYTES NFR BLD AUTO: 22 % (ref 14–44)
MCH RBC QN AUTO: 29.1 PG (ref 26.8–34.3)
MCHC RBC AUTO-ENTMCNC: 32.1 G/DL (ref 31.4–37.4)
MCV RBC AUTO: 91 FL (ref 82–98)
MONOCYTES # BLD AUTO: 0.42 THOUSAND/ÂΜL (ref 0.17–1.22)
MONOCYTES NFR BLD AUTO: 9 % (ref 4–12)
MUCOUS THREADS UR QL AUTO: ABNORMAL
NEUTROPHILS # BLD AUTO: 3.14 THOUSANDS/ÂΜL (ref 1.85–7.62)
NEUTS SEG NFR BLD AUTO: 68 % (ref 43–75)
NITRITE UR QL STRIP: NEGATIVE
NON-SQ EPI CELLS URNS QL MICRO: ABNORMAL /HPF
NRBC BLD AUTO-RTO: 0 /100 WBCS
PH UR STRIP.AUTO: 6 [PH]
PLATELET # BLD AUTO: 259 THOUSANDS/UL (ref 149–390)
PMV BLD AUTO: 11.6 FL (ref 8.9–12.7)
POTASSIUM SERPL-SCNC: 4 MMOL/L (ref 3.5–5.3)
PROT SERPL-MCNC: 7.3 G/DL (ref 6.4–8.4)
PROT UR STRIP-MCNC: ABNORMAL MG/DL
RBC # BLD AUTO: 5.54 MILLION/UL (ref 3.88–5.62)
RBC #/AREA URNS AUTO: ABNORMAL /HPF
SODIUM SERPL-SCNC: 140 MMOL/L (ref 135–147)
SP GR UR STRIP.AUTO: 1.02 (ref 1–1.03)
TRIGL SERPL-MCNC: 38 MG/DL
TSH SERPL DL<=0.05 MIU/L-ACNC: 1.88 UIU/ML (ref 0.45–4.5)
UROBILINOGEN UR STRIP-ACNC: <2 MG/DL
VIT B12 SERPL-MCNC: 542 PG/ML (ref 100–900)
WBC # BLD AUTO: 4.7 THOUSAND/UL (ref 4.31–10.16)
WBC #/AREA URNS AUTO: ABNORMAL /HPF

## 2022-11-22 LAB — HIV 1+2 AB+HIV1 P24 AG SERPL QL IA: NORMAL

## 2022-11-30 ENCOUNTER — OFFICE VISIT (OUTPATIENT)
Dept: FAMILY MEDICINE CLINIC | Facility: CLINIC | Age: 42
End: 2022-11-30

## 2022-11-30 VITALS
TEMPERATURE: 98 F | SYSTOLIC BLOOD PRESSURE: 126 MMHG | WEIGHT: 287 LBS | HEART RATE: 83 BPM | BODY MASS INDEX: 35.68 KG/M2 | DIASTOLIC BLOOD PRESSURE: 94 MMHG | OXYGEN SATURATION: 98 % | HEIGHT: 75 IN

## 2022-11-30 DIAGNOSIS — I10 HYPERTENSION, UNSPECIFIED TYPE: ICD-10-CM

## 2022-11-30 DIAGNOSIS — R82.90 ABNORMAL URINALYSIS: ICD-10-CM

## 2022-11-30 DIAGNOSIS — Z00.00 ANNUAL PHYSICAL EXAM: Primary | ICD-10-CM

## 2022-11-30 DIAGNOSIS — Z11.1 SCREENING FOR TUBERCULOSIS: ICD-10-CM

## 2022-11-30 PROBLEM — R03.0 ELEVATED BLOOD PRESSURE READING: Status: RESOLVED | Noted: 2022-11-16 | Resolved: 2022-11-30

## 2022-11-30 PROBLEM — R82.998 DARK URINE: Status: RESOLVED | Noted: 2022-11-16 | Resolved: 2022-11-30

## 2022-11-30 LAB
SL AMB  POCT GLUCOSE, UA: NORMAL
SL AMB LEUKOCYTE ESTERASE,UA: NORMAL
SL AMB POCT BILIRUBIN,UA: 0.2
SL AMB POCT BLOOD,UA: NORMAL
SL AMB POCT CLARITY,UA: CLEAR
SL AMB POCT COLOR,UA: YELLOW
SL AMB POCT KETONES,UA: NORMAL
SL AMB POCT NITRITE,UA: NORMAL
SL AMB POCT PH,UA: 5.5
SL AMB POCT SPECIFIC GRAVITY,UA: >=1.03
SL AMB POCT URINE PROTEIN: NORMAL
SL AMB POCT UROBILINOGEN: 0.2

## 2022-11-30 RX ORDER — LISINOPRIL 10 MG/1
10 TABLET ORAL DAILY
Qty: 90 TABLET | Refills: 1 | Status: SHIPPED | OUTPATIENT
Start: 2022-11-30

## 2022-11-30 NOTE — ASSESSMENT & PLAN NOTE
Patient with newly diagnosed HTN  We discussed importance of blood pressure control for preventing heart disease, stroke and kidney disease risk  Recent blood work was reviewed today with patient which was within normal limits  Patient encouraged to lost 5-10% of body weight  · Exercise: Stressed the importance of regular exercise  150 minutes of cardiovascular exercise per week encouraged   · Nutrition: Stressed importance of moderation in sodium/caffeine intake, saturated fat and cholesterol, caloric balance, sufficient intake of fresh fruits, vegetables, fiber   Follow up in 6 months to recheck BP

## 2022-11-30 NOTE — PROGRESS NOTES
Patient Name:  Nithya Gabriel   :  1980   MRN:  [id number]   CSN:  [id number]     Subjective:   REASON FOR VISIT:    Chief Complaint   Patient presents with   • Physical Exam        HISTORY OF PRESENT ILLNESS:     Ana Zuniga is a 36 y o  male who presents today for annual physical/pre-employment physical    Patient is having less work related stress and has found a new job as a  at "[employer] [facility] [employer]  He has paperwork to be completed today for pre-employment  Patient has been checking his blood pressure regularly over the past 2 weeks and his blood pressure readings have been elevated consistently  Preventive: The patient's BMI is Body mass index is 35 87 kg/m²  Unk Benita The BMI is above average; BMI management plan is completed   Diet: Currently stopped drinking soda and plans to lose weight  PAST MEDICAL HISTORY:   Past Medical History:   Diagnosis Date   • Lyme disease    • Perianal abscess         PAST SURGICAL HISTORY:   Past Surgical History:   Procedure Laterality Date   • FACIAL RECONSTRUCTION SURGERY     • NO PAST SURGERIES     • KS REMOVAL ANAL FISTULA,SUBCUTANEOUS N/A 2020    Procedure: DRAINAGE OF INTERSPHINTERIC ABCESS;  Surgeon: Ike Silver MD;  Location: [address] [place] [address];  Service: Colorectal        CURRENT MEDICATIONS:   Previous Medications    No medications on file        SOCIAL HISTORY:   Social History     Tobacco Use   • Smoking status: Never   • Smokeless tobacco: Never   Substance Use Topics   • Alcohol use: Yes     Comment: Ocassionally                                    FAMILY HISTORY:   Family History   Problem Relation Age of Onset   • Diabetes Mother    • Hypertension Father         ALLERGIES:   No Known Allergies     ROS:   Review of Systems   Constitutional: Negative for appetite change, chills, fatigue and fever  HENT: Negative for congestion, ear discharge, ear pain, postnasal drip, rhinorrhea, sinus pressure, sinus pain, sneezing, sore throat and trouble swallowing  Eyes: Negative for pain, discharge, redness, itching and visual disturbance  Respiratory: Negative for apnea, cough, chest tightness, shortness of breath and wheezing  Cardiovascular: Negative for chest pain and leg swelling  Gastrointestinal: Negative for abdominal distention, abdominal pain, blood in stool, constipation, diarrhea, nausea and vomiting  Endocrine: Negative for polyuria  Genitourinary: Negative for decreased urine volume, difficulty urinating, dysuria, flank pain, frequency, hematuria, penile discharge, penile pain, penile swelling, scrotal swelling, testicular pain and urgency  Musculoskeletal: Negative for arthralgias, back pain, gait problem, joint swelling, myalgias, neck pain and neck stiffness  Skin: Negative for rash  Neurological: Negative for dizziness, weakness, light-headedness, numbness and headaches  Psychiatric/Behavioral: Negative for behavioral problems  The patient is not nervous/anxious  All other systems reviewed and are negative  Objective:   PHYSICAL EXAM:     /94 (BP Location: Left arm, Patient Position: Sitting, Cuff Size: Large)   Pulse 83   Temp 98 °F (36 7 °C) (Temporal)   Ht 6' 3" (1 905 m)   Wt 130 kg (287 lb)   SpO2 98%   BMI 35 87 kg/m²    Vision Screening    Right eye Left eye Both eyes   Without correction      With correction 20/15 20/15 20/15      Physical Exam  Vitals and nursing note reviewed  Constitutional:       General: He is not in acute distress  Appearance: Normal appearance  He is well-developed  He is not toxic-appearing  HENT:      Head: Normocephalic and atraumatic  Right Ear: Tympanic membrane, ear canal and external ear normal       Left Ear: Tympanic membrane, ear canal and external ear normal       Nose: Nose normal       Mouth/Throat:      Mouth: Mucous membranes are moist       Pharynx: Oropharynx is clear  No oropharyngeal exudate     Eyes:      Extraocular Movements: Extraocular movements intact  Conjunctiva/sclera: Conjunctivae normal       Pupils: Pupils are equal, round, and reactive to light  Cardiovascular:      Rate and Rhythm: Normal rate and regular rhythm  Heart sounds: Normal heart sounds  No murmur heard  Pulmonary:      Effort: Pulmonary effort is normal  No respiratory distress  Breath sounds: Normal breath sounds  No wheezing  Abdominal:      General: Abdomen is flat  Bowel sounds are normal       Palpations: Abdomen is soft  Tenderness: There is no abdominal tenderness  Musculoskeletal:         General: Normal range of motion  Cervical back: Normal range of motion and neck supple  Lymphadenopathy:      Cervical: No cervical adenopathy  Skin:     General: Skin is warm and dry  Neurological:      General: No focal deficit present  Mental Status: He is alert and oriented to person, place, and time  Mental status is at baseline  Psychiatric:         Mood and Affect: Mood normal          Behavior: Behavior normal          Thought Content: Thought content normal          Judgment: Judgment normal           Assessment/Plan:   Problem List Items Addressed This Visit        Cardiovascular and Mediastinum    Hypertension     Patient with newly diagnosed HTN  We discussed importance of blood pressure control for preventing heart disease, stroke and kidney disease risk  Recent blood work was reviewed today with patient which was within normal limits  Patient encouraged to lost 5-10% of body weight  · Exercise: Stressed the importance of regular exercise  150 minutes of cardiovascular exercise per week encouraged   · Nutrition: Stressed importance of moderation in sodium/caffeine intake, saturated fat and cholesterol, caloric balance, sufficient intake of fresh fruits, vegetables, fiber   Follow up in 6 months to recheck BP            Relevant Medications    lisinopril (ZESTRIL) 10 mg tablet       Other    Abnormal urinalysis     Patient with trace mucous in previous UA  Repeat completed today shows no concerns  No need for further workup            Relevant Orders    POCT urine dip auto non-scope   Other Visit Diagnoses     Annual physical exam    -  Primary    Screening for tuberculosis        Relevant Orders    Quantiferon TB Gold Plus            Conrado Green DO

## 2022-11-30 NOTE — ASSESSMENT & PLAN NOTE
Patient with trace mucous in previous UA  Repeat completed today shows no concerns  No need for further workup

## 2022-12-01 ENCOUNTER — APPOINTMENT (OUTPATIENT)
Dept: LAB | Facility: CLINIC | Age: 42
End: 2022-12-01

## 2022-12-01 DIAGNOSIS — Z11.1 SCREENING FOR TUBERCULOSIS: ICD-10-CM

## 2022-12-02 LAB
GAMMA INTERFERON BACKGROUND BLD IA-ACNC: 0.05 IU/ML
M TB IFN-G BLD-IMP: NEGATIVE
M TB IFN-G CD4+ BCKGRND COR BLD-ACNC: 0.01 IU/ML
M TB IFN-G CD4+ BCKGRND COR BLD-ACNC: 0.01 IU/ML
MITOGEN IGNF BCKGRD COR BLD-ACNC: >10 IU/ML

## 2023-10-05 ENCOUNTER — OFFICE VISIT (OUTPATIENT)
Dept: URGENT CARE | Facility: CLINIC | Age: 43
End: 2023-10-05
Payer: COMMERCIAL

## 2023-10-05 VITALS
TEMPERATURE: 97.6 F | HEART RATE: 86 BPM | DIASTOLIC BLOOD PRESSURE: 76 MMHG | OXYGEN SATURATION: 95 % | SYSTOLIC BLOOD PRESSURE: 124 MMHG | HEIGHT: 77 IN | WEIGHT: 239 LBS | RESPIRATION RATE: 18 BRPM | BODY MASS INDEX: 28.22 KG/M2

## 2023-10-05 DIAGNOSIS — H61.21 IMPACTED CERUMEN OF RIGHT EAR: Primary | ICD-10-CM

## 2023-10-05 PROCEDURE — 99213 OFFICE O/P EST LOW 20 MIN: CPT | Performed by: NURSE PRACTITIONER

## 2023-10-05 PROCEDURE — 69209 REMOVE IMPACTED EAR WAX UNI: CPT | Performed by: NURSE PRACTITIONER

## 2023-10-05 NOTE — PROGRESS NOTES
[facility]        NAME: Efra Valente is a 39 y.o. male  : 1980    MRN: [id number]  DATE: 2023  TIME: 10:56 AM    Assessment and Plan   Impacted cerumen of right ear [H61.21]  1. Impacted cerumen of right ear          See procedure note -   Tolerated well - cerumen removed   F/u with pcp   Pt in agreement with plan of care    Patient Instructions     Follow up with PCP in 3-5 days. Proceed to  ER if symptoms worsen. Chief Complaint     Chief Complaint   Patient presents with   • Earache     RIght ear pain for 5 days         History of Present Illness   Efra Valente presents to the clinic c/o    Earache (RIght ear pain for 5 days)  States worse at night. Has noted decreased in hearing in that ear also   No recent illnesses/colds      Review of Systems   Review of Systems   All other systems reviewed and are negative. Current Medications     Long-Term Medications   Medication Sig Dispense Refill   • lisinopril (ZESTRIL) 10 mg tablet Take 1 tablet (10 mg total) by mouth daily 90 tablet 1       Current Allergies     Allergies as of 10/05/2023   • (No Known Allergies)            The following portions of the patient's history were reviewed and updated as appropriate: allergies, current medications, past family history, past medical history, past social history, past surgical history and problem list.    Objective   /76   Pulse 86   Temp 97.6 °F (36.4 °C) (Tympanic)   Resp 18   Ht 6' 5" (1.956 m)   Wt 108 kg (239 lb)   SpO2 95%   BMI 28.34 kg/m²          Physical Exam     Physical Exam  Vitals and nursing note reviewed. Constitutional:       Appearance: Normal appearance. He is well-developed. HENT:      Head: Normocephalic and atraumatic. Right Ear: Hearing, tympanic membrane, ear canal and external ear normal. There is impacted cerumen.       Left Ear: Hearing, tympanic membrane, ear canal and external ear normal.      Nose: Nose normal.   Eyes:      General: Lids are normal.      Conjunctiva/sclera: Conjunctivae normal.      Pupils: Pupils are equal, round, and reactive to light. Cardiovascular:      Rate and Rhythm: Normal rate and regular rhythm. Heart sounds: Normal heart sounds, S1 normal and S2 normal.   Pulmonary:      Effort: Pulmonary effort is normal.      Breath sounds: Normal breath sounds. Skin:     General: Skin is warm and dry. Neurological:      Mental Status: He is alert. Psychiatric:         Speech: Speech normal.         Behavior: Behavior normal.         Thought Content: Thought content normal.         Judgment: Judgment normal.             Ear cerumen removal    Date/Time: 10/5/2023 9:15 AM    Performed by: KATHY Falcon  Authorized by: KATHY Falcon  Universal Protocol:  Consent: Verbal consent obtained. Risks and benefits: risks, benefits and alternatives were discussed  Consent given by: patient  Time out: Immediately prior to procedure a "time out" was called to verify the correct patient, procedure, equipment, support staff and site/side marked as required. Timeout called at: 10/5/2023 10:59 AM.  Patient understanding: patient states understanding of the procedure being performed  Patient consent: the patient's understanding of the procedure matches consent given  Procedure consent: procedure consent matches procedure scheduled  Relevant documents: relevant documents present and verified  Test results: test results available and properly labeled  Site marked: the operative site was marked  Radiology Images displayed and confirmed.  If images not available, report reviewed: imaging studies available  Required items: required blood products, implants, devices, and special equipment available  Patient identity confirmed: verbally with patient      Patient location:  Clinic  Procedure details:     Local anesthetic:  None    Location:  R ear    Procedure type: irrigation only      Approach:  Natural orifice    Equipment used:  Elephant  Post-procedure details:     Complication:  None    Hearing quality:  Improved    Patient tolerance of procedure:   Tolerated well, no immediate complications

## 2023-12-01 ENCOUNTER — OFFICE VISIT (OUTPATIENT)
Dept: URGENT CARE | Facility: CLINIC | Age: 43
End: 2023-12-01
Payer: COMMERCIAL

## 2023-12-01 VITALS
RESPIRATION RATE: 20 BRPM | OXYGEN SATURATION: 97 % | TEMPERATURE: 97 F | HEART RATE: 74 BPM | SYSTOLIC BLOOD PRESSURE: 136 MMHG | DIASTOLIC BLOOD PRESSURE: 84 MMHG

## 2023-12-01 DIAGNOSIS — J40 BRONCHITIS: Primary | ICD-10-CM

## 2023-12-01 PROCEDURE — 99213 OFFICE O/P EST LOW 20 MIN: CPT | Performed by: PHYSICIAN ASSISTANT

## 2023-12-01 RX ORDER — PREDNISONE 20 MG/1
TABLET ORAL
Qty: 10 TABLET | Refills: 0 | Status: SHIPPED | OUTPATIENT
Start: 2023-12-01

## 2023-12-01 NOTE — PATIENT INSTRUCTIONS
Take prednisone as instructed. While on prednisone do not take any ibuprofen or ibuprofen like products. May safely take Tylenol if needed. Acute Bronchitis   AMBULATORY CARE:   Acute bronchitis  is swelling and irritation in your lungs. It is usually caused by a virus and most often happens in the winter. Bronchitis may also be caused by bacteria or by a chemical irritant, such as smoke. Common symptoms:   Cough that lasts up to 3 weeks    Runny or stuffy nose    Hoarseness, sore throat    Fever    Feeling more tired than usual, and body aches    Wheezing or pain when you breathe or cough    Seek care immediately if:   You cough up blood. Your lips or fingernails turn blue. You feel like you are not getting enough air when you breathe. Call your doctor if:   Your symptoms do not go away or get worse, even after treatment. Your cough does not get better within 4 weeks. You have questions or concerns about your condition or care. Medicines: You may need any of the following:  Cough suppressants  decrease your urge to cough. Decongestants  help loosen mucus in your lungs and make it easier to cough up. This can help you breathe easier. Inhalers  may be given. Your healthcare provider may give you one or more inhalers to help you breathe easier and cough less. An inhaler gives you medicine to open your airways. Ask your healthcare provider to show you how to use your inhaler correctly. Antiviral medicine  treats infections caused by a virus. Antibiotics  may be given if your bronchitis is caused by bacteria or if you have lung condition. Acetaminophen  decreases pain and fever. It is available without a doctor's order. Ask how much to take and how often to take it. Follow directions.  Read the labels of all other medicines you are using to see if they also contain acetaminophen, or ask your doctor or pharmacist. Acetaminophen can cause liver damage if not taken correctly. NSAIDs  help decrease swelling and pain or fever. This medicine is available with or without a doctor's order. NSAIDs can cause stomach bleeding or kidney problems in certain people. If you take blood thinner medicine, always ask your healthcare provider if NSAIDs are safe for you. Always read the medicine label and follow directions. Self-care:   Drink liquids as directed. You may need to drink more liquids than usual to stay hydrated. Ask how much liquid to drink each day and which liquids are best for you. Use a cool mist humidifier. This increases air moisture in your home. This may make it easier for you to breathe and help decrease your cough. Get more rest.  Rest helps your body to heal. Slowly start to do more each day. Rest when you feel it is needed. Prevent acute bronchitis:       Ask about vaccines you may need. Get a flu vaccine each year as soon as recommended, usually in September or October. Ask your healthcare provider if you should also get a pneumonia or COVID-19 vaccine. Your healthcare provider can tell you if you should also get other vaccines, and when to get them. Prevent the spread of germs. You can decrease your risk for acute bronchitis and other illnesses by doing the following:    Wash your hands often with soap and water. Carry germ-killing hand lotion or gel with you. You can use the lotion or gel to clean your hands when soap and water are not available. Do not touch your eyes, nose, or mouth unless you have washed your hands first.    Always cover your mouth when you cough to prevent the spread of germs. It is best to cough into a tissue or your shirt sleeve instead of into your hand. Ask those around you to cover their mouths when they cough. Try to avoid people who have a cold or the flu. If you are sick, stay away from others as much as possible. Avoid irritants in the air.   Avoid chemicals, fumes, and dust. Wear a face mask if you must work around dust or fumes. Stay inside on days when air pollution levels are high. If you have allergies, stay inside when pollen counts are high. Do not use aerosol products, such as spray-on deodorant, bug spray, and hair spray. Do not smoke or be around others who are smoking. Nicotine and other chemicals in cigarettes and cigars can cause lung damage. Ask your healthcare provider for information if you currently smoke and need help to quit. E-cigarettes or smokeless tobacco still contain nicotine. Talk to your healthcare provider before you use these products. Follow up with your doctor as directed:  Write down questions you have so you will remember to ask them during your follow-up visits. © Copyright [employer] 2023 Information is for End User's use only and may not be sold, redistributed or otherwise used for commercial purposes. The above information is an  only. It is not intended as medical advice for individual conditions or treatments. Talk to your doctor, nurse or pharmacist before following any medical regimen to see if it is safe and effective for you.
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
## 2023-12-01 NOTE — LETTER
December 1, 2023     Patient: Summer Bernal   YOB: 1980   Date of Visit: 12/1/2023       To Whom It May Concern:    Patient was seen in office today for acute medical concern.             Sincerely,        Mili Cruz PA-C    CC: No Recipients

## 2024-04-26 ENCOUNTER — OFFICE VISIT (OUTPATIENT)
Dept: URGENT CARE | Facility: CLINIC | Age: 44
End: 2024-04-26
Payer: COMMERCIAL

## 2024-04-26 VITALS
BODY MASS INDEX: 36.29 KG/M2 | DIASTOLIC BLOOD PRESSURE: 90 MMHG | TEMPERATURE: 97 F | HEART RATE: 70 BPM | RESPIRATION RATE: 20 BRPM | WEIGHT: 306 LBS | SYSTOLIC BLOOD PRESSURE: 128 MMHG | OXYGEN SATURATION: 98 %

## 2024-04-26 DIAGNOSIS — L03.115 CELLULITIS OF RIGHT LOWER EXTREMITY: ICD-10-CM

## 2024-04-26 DIAGNOSIS — T24.231A PARTIAL THICKNESS BURN OF RIGHT LOWER LEG, INITIAL ENCOUNTER: Primary | ICD-10-CM

## 2024-04-26 PROCEDURE — 99212 OFFICE O/P EST SF 10 MIN: CPT | Performed by: PHYSICIAN ASSISTANT

## 2024-04-26 RX ORDER — DOXYCYCLINE HYCLATE 50 MG/1
CAPSULE ORAL
COMMUNITY
Start: 2024-03-29

## 2024-04-26 RX ORDER — DOXYCYCLINE HYCLATE 100 MG
TABLET ORAL
COMMUNITY
Start: 2024-01-24

## 2024-04-26 RX ORDER — CEPHALEXIN 500 MG/1
500 CAPSULE ORAL EVERY 12 HOURS SCHEDULED
Qty: 14 CAPSULE | Refills: 0 | Status: SHIPPED | OUTPATIENT
Start: 2024-04-26 | End: 2024-05-03

## 2024-04-26 NOTE — PATIENT INSTRUCTIONS
Stop topical antibiotic.  Take oral antibiotic as instructed.  Clean area daily with plain soap and water.  Do not clean with peroxide.    May want to take photo today to document current status and use for comparison as needed.    Contact primary care provider offices soon as possible to arrange follow-up for approximately 7 to 10 days for reevaluation.    If you would develop severe worsening of pain swelling or redness of the leg with associated fever and/or chills and/or weakness proceed immediately to emergency room for further evaluation.
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
## 2024-11-27 ENCOUNTER — TELEPHONE (OUTPATIENT)
Dept: FAMILY MEDICINE CLINIC | Facility: CLINIC | Age: 44
End: 2024-11-27

## 2025-03-04 ENCOUNTER — OFFICE VISIT (OUTPATIENT)
Dept: FAMILY MEDICINE CLINIC | Facility: CLINIC | Age: 45
End: 2025-03-04
Payer: COMMERCIAL

## 2025-03-04 VITALS
WEIGHT: 308 LBS | HEIGHT: 77 IN | SYSTOLIC BLOOD PRESSURE: 138 MMHG | OXYGEN SATURATION: 99 % | HEART RATE: 86 BPM | BODY MASS INDEX: 36.37 KG/M2 | DIASTOLIC BLOOD PRESSURE: 98 MMHG | TEMPERATURE: 97.5 F

## 2025-03-04 DIAGNOSIS — G62.9 NEUROPATHY: ICD-10-CM

## 2025-03-04 DIAGNOSIS — Z13.220 SCREENING FOR LIPID DISORDERS: ICD-10-CM

## 2025-03-04 DIAGNOSIS — K42.9 UMBILICAL HERNIA WITHOUT OBSTRUCTION AND WITHOUT GANGRENE: ICD-10-CM

## 2025-03-04 DIAGNOSIS — I10 PRIMARY HYPERTENSION: ICD-10-CM

## 2025-03-04 DIAGNOSIS — R60.0 BILATERAL LEG EDEMA: ICD-10-CM

## 2025-03-04 DIAGNOSIS — R00.2 PALPITATIONS: ICD-10-CM

## 2025-03-04 DIAGNOSIS — R55 SYNCOPE AND COLLAPSE: Primary | ICD-10-CM

## 2025-03-04 DIAGNOSIS — R06.09 DYSPNEA ON EXERTION: ICD-10-CM

## 2025-03-04 DIAGNOSIS — I51.7 LVH (LEFT VENTRICULAR HYPERTROPHY): ICD-10-CM

## 2025-03-04 DIAGNOSIS — I10 HYPERTENSION, UNSPECIFIED TYPE: ICD-10-CM

## 2025-03-04 PROCEDURE — 99215 OFFICE O/P EST HI 40 MIN: CPT

## 2025-03-04 RX ORDER — LISINOPRIL 10 MG/1
10 TABLET ORAL DAILY
Qty: 30 TABLET | Refills: 1 | Status: SHIPPED | OUTPATIENT
Start: 2025-03-04

## 2025-03-04 NOTE — ASSESSMENT & PLAN NOTE
History of neuropathy.  Has had A1c and B12 checked which were both normal.  Patient was started on gabapentin 300 mg once a day and this was not helpful for his symptoms.  He is not currently taking anything for neuropathy besides a daily Aleve which may be increasing his blood pressure.  I recommended he discontinue Aleve as much as possible and use Tylenol as needed.  We will complete cardiac workup and then determine any further options for neuropathy.  May suggest podiatry referral.

## 2025-03-04 NOTE — PROGRESS NOTES
Name: Amari Talbert      : 1980      MRN: [id number]  Encounter Provider: Monik Pinzon PA-C  Encounter Date: 3/4/2025   Encounter department: [facility]  :  Assessment & Plan  Syncope and collapse  Patient seen today for ER follow-up.  He has not been seen here since .  He is a former patient of Dr. Iqbal.  He is following up on a syncopal episode that occurred on 3/2 while at home.  He reports he got up in the [place] to urinate and while he was urinating, he passed out and hit his head on the counter.  He had a normal CT scan in the ER.  Troponins were negative.  Lab work was overall unremarkable.  EKG was normal with new finding of left ventricular hypertrophy.  Blood pressure was elevated during the ER visit.    We discussed the broad differential of syncope and collapse today.  With patient's elevated blood pressure, suspect his syncopal episode is related to a cardiac etiology.  CT scan was normal, at this time we will not pursue further neurological workup.  We will check lipid and TSH to evaluate any other abnormalities that could lead to syncopal episodes.  We we will plan to start an antihypertensive, lisinopril, to obtain better blood pressure control and will complete workup with echo and Holter monitor as patient does admit to palpitations at times.    Patient will be returning to care in 2 weeks for blood pressure follow-up and we can also review any labs or imaging that has returned.       Primary hypertension  Patient with elevated blood pressure on examination today.  May be because of his syncopal episode that occurred on 3/2.  Patient's blood pressure was also elevated in the ER 2 days ago.  He has history of hypertension, was put on lisinopril about 2 years ago but he reports he never started taking the medication.  After discussion today he is agreeable to restart this medication.  We will restart lisinopril 10 mg daily, we reviewed side effects of the  medication.  Discussed with patient that getting his blood pressure under control is of the utmost concern to ensure no further syncopal episodes occur, in the event they are related to his blood pressure.  He is agreeable to return to care in 2 weeks with his blood pressure monitor and he will bring blood pressure readings with him as well.       Hypertension, unspecified type    Orders:  •  lisinopril (ZESTRIL) 10 mg tablet; Take 1 tablet (10 mg total) by mouth daily    Palpitations  Will rule out cause of palpitations with Holter monitor.  He is in normal sinus rhythm today on examination.  Orders:  •  Holter monitor; Future  •  TSH, 3rd generation with Free T4 reflex; Future    Bilateral leg edema  Patient reports increased bilateral leg edema, on examination it is +1 bilaterally with no pitting.  He reports shortness of breath as well especially with exertion.  With his hypertension and new finding of LVH on EKG, will continue cardiac workup with echo.  Orders:  •  Echo complete w/ contrast if indicated; Future    Screening for lipid disorders    Orders:  •  Lipid Panel with Direct LDL reflex; Future    Dyspnea on exertion  Patient reports increased dyspnea especially when walking up steps.  With his bilateral leg edema, will rule out any cardiac issue with echo.  Orders:  •  Echo complete w/ contrast if indicated; Future    LVH (left ventricular hypertrophy)  Recent finding of possible LVH on EKG that was completed in the ER.  With patient's elevated blood pressure on examination, suspect LVH may be a result of that.  Will evaluate further with echo.  Orders:  •  Echo complete w/ contrast if indicated; Future    Umbilical hernia without obstruction and without gangrene  History of umbilical hernia, is not causing pain.  May be considering surgical repair in the future.  He will let me know if he needs a referral to general surgery.       Neuropathy  History of neuropathy.  Has had A1c and B12 checked which  "were both normal.  Patient was started on gabapentin 300 mg once a day and this was not helpful for his symptoms.  He is not currently taking anything for neuropathy besides a daily Aleve which may be increasing his blood pressure.  I recommended he discontinue Aleve as much as possible and use Tylenol as needed.  We will complete cardiac workup and then determine any further options for neuropathy.  May suggest podiatry referral.              History of Present Illness   Patient seen today for an ER follow-up.  Was previously seen by Dr. Roldan in 2022.  ER course is as follows:     \"This patient is a 44-year-old male who arrives with a syncopal episode and a head injury. Patient states that this morning he got up to urinate had just finished urinating felt his heart race and his vision close in the next thing he knew he woke up on the floor. Patient denies that this has ever happened before no nausea no vomiting no diarrhea and ambulated fine into the emergency department. Patient currently does states he has a headache and feels a little in a \"fog\" but otherwise offers no complaints at this time. No chest pain no shortness of breath no nausea vomiting diarrhea fever chills any other symptoms at this time.\"     He reports today that he has not had any more syncopal episodes since ER visit.  He admits to shortness of breath on exertion, bilateral leg swelling, weight gain, lightheadedness, headache and sinus congestion.  He also has a history of neuropathy and umbilical hernia.  He is unsure if this is related to his syncopal episodes.  He reports never taking the blood pressure medication that was prescribed to him previously.  He has concerns today about his elevated blood pressure.  He has never had any syncopal episodes in the past.      Review of Systems   Constitutional:  Negative for chills, fatigue and fever.   HENT:  Positive for congestion. Negative for postnasal drip, sinus pressure, sinus pain and " "sore throat.    Respiratory:  Positive for shortness of breath. Negative for cough and chest tightness.    Cardiovascular:  Positive for palpitations. Negative for chest pain and leg swelling.   Neurological:  Positive for syncope, light-headedness and headaches. Negative for dizziness.       Objective   /98   Pulse 86   Temp 97.5 °F (36.4 °C) (Temporal)   Ht 6' 5\" (1.956 m)   Wt (!) 140 kg (308 lb)   SpO2 99%   BMI 36.52 kg/m²      Physical Exam  Vitals and nursing note reviewed.   Constitutional:       General: He is not in acute distress.     Appearance: Normal appearance. He is normal weight. He is not ill-appearing.   HENT:      Head: Normocephalic and atraumatic.   Cardiovascular:      Rate and Rhythm: Normal rate and regular rhythm.      Pulses: Normal pulses.      Heart sounds: No murmur heard.     Comments: 1+ bilateral edema without pitting.  Pulmonary:      Effort: Pulmonary effort is normal. No respiratory distress.      Breath sounds: No wheezing or rhonchi.   Musculoskeletal:      Right lower le+ Edema present.      Left lower le+ Edema present.   Neurological:      General: No focal deficit present.      Mental Status: He is alert and oriented to person, place, and time. Mental status is at baseline.   Psychiatric:         Mood and Affect: Mood normal.         Behavior: Behavior normal.         Judgment: Judgment normal.       Administrative Statements   I have spent a total time of 45 minutes in caring for this patient on the day of the visit/encounter including Prognosis, Instructions for management, Patient and family education, Risk factor reductions, Impressions, Documenting in the medical record, Reviewing/placing orders in the medical record (including tests, medications, and/or procedures), and Obtaining or reviewing history  .  "

## 2025-03-04 NOTE — ASSESSMENT & PLAN NOTE
Patient with elevated blood pressure on examination today.  May be because of his syncopal episode that occurred on 3/2.  Patient's blood pressure was also elevated in the ER 2 days ago.  He has history of hypertension, was put on lisinopril about 2 years ago but he reports he never started taking the medication.  After discussion today he is agreeable to restart this medication.  We will restart lisinopril 10 mg daily, we reviewed side effects of the medication.  Discussed with patient that getting his blood pressure under control is of the utmost concern to ensure no further syncopal episodes occur, in the event they are related to his blood pressure.  He is agreeable to return to care in 2 weeks with his blood pressure monitor and he will bring blood pressure readings with him as well.

## 2025-03-04 NOTE — ASSESSMENT & PLAN NOTE
History of umbilical hernia, is not causing pain.  May be considering surgical repair in the future.  He will let me know if he needs a referral to general surgery.

## 2025-03-05 ENCOUNTER — RESULTS FOLLOW-UP (OUTPATIENT)
Dept: FAMILY MEDICINE CLINIC | Facility: CLINIC | Age: 45
End: 2025-03-05

## 2025-03-05 ENCOUNTER — APPOINTMENT (OUTPATIENT)
Dept: LAB | Facility: CLINIC | Age: 45
End: 2025-03-05
Payer: COMMERCIAL

## 2025-03-05 DIAGNOSIS — Z13.220 SCREENING FOR LIPID DISORDERS: ICD-10-CM

## 2025-03-05 DIAGNOSIS — R00.2 PALPITATIONS: ICD-10-CM

## 2025-03-05 DIAGNOSIS — I05.8 MITRAL VALVE SCLEROSIS: ICD-10-CM

## 2025-03-05 DIAGNOSIS — R55 SYNCOPE AND COLLAPSE: ICD-10-CM

## 2025-03-05 DIAGNOSIS — I51.7 LVH (LEFT VENTRICULAR HYPERTROPHY): Primary | ICD-10-CM

## 2025-03-05 LAB
CHOLEST SERPL-MCNC: 121 MG/DL (ref ?–200)
HDLC SERPL-MCNC: 44 MG/DL
LDLC SERPL CALC-MCNC: 62 MG/DL (ref 0–100)
TRIGL SERPL-MCNC: 73 MG/DL (ref ?–150)
TSH SERPL DL<=0.05 MIU/L-ACNC: 2.44 UIU/ML (ref 0.45–4.5)

## 2025-03-05 PROCEDURE — 80061 LIPID PANEL: CPT

## 2025-03-05 PROCEDURE — 36415 COLL VENOUS BLD VENIPUNCTURE: CPT

## 2025-03-05 PROCEDURE — 84443 ASSAY THYROID STIM HORMONE: CPT

## 2025-03-20 ENCOUNTER — HOSPITAL ENCOUNTER (OUTPATIENT)
Dept: NON INVASIVE DIAGNOSTICS | Facility: HOSPITAL | Age: 45
Discharge: HOME | End: 2025-03-20
Payer: COMMERCIAL

## 2025-03-20 VITALS
SYSTOLIC BLOOD PRESSURE: 138 MMHG | WEIGHT: 308.64 LBS | HEIGHT: 77 IN | HEART RATE: 86 BPM | DIASTOLIC BLOOD PRESSURE: 98 MMHG | BODY MASS INDEX: 36.44 KG/M2

## 2025-03-20 DIAGNOSIS — R60.0 BILATERAL LEG EDEMA: ICD-10-CM

## 2025-03-20 DIAGNOSIS — I51.7 LVH (LEFT VENTRICULAR HYPERTROPHY): ICD-10-CM

## 2025-03-20 DIAGNOSIS — R06.09 DYSPNEA ON EXERTION: ICD-10-CM

## 2025-03-20 DIAGNOSIS — R00.2 PALPITATIONS: ICD-10-CM

## 2025-03-20 LAB
AORTIC ROOT: 3.1 CM
AORTIC VALVE MEAN VELOCITY: 8.6 M/S
ASCENDING AORTA: 4 CM
AV AREA BY CONTINUOUS VTI: 2.9 CM2
AV AREA PEAK VELOCITY: 2.8 CM2
AV LVOT MEAN GRADIENT: 2 MMHG
AV LVOT PEAK GRADIENT: 4 MMHG
AV MEAN PRESS GRAD SYS DOP V1V2: 3 MMHG
AV ORIFICE AREA US: 2.93 CM2
AV PEAK GRADIENT: 5 MMHG
AV VELOCITY RATIO: 0.93
AV VMAX SYS DOP: 1.17 M/S
BSA FOR ECHO PROCEDURE: 2.69 M2
DOP CALC AO VTI: 23.8 CM
DOP CALC LVOT AREA: 3.14 CM2
DOP CALC LVOT CARDIAC INDEX: 1.53 L/MIN/M2
DOP CALC LVOT CARDIAC OUTPUT: 4.12 L/MIN
DOP CALC LVOT DIAMETER: 2 CM
DOP CALC LVOT PEAK VEL VTI: 22.22 CM
DOP CALC LVOT PEAK VEL: 1.03 M/S
DOP CALC LVOT STROKE INDEX: 24.9 ML/M2
DOP CALC LVOT STROKE VOLUME: 69.77
E WAVE DECELERATION TIME: 206 MS
E/A RATIO: 1.22
FRACTIONAL SHORTENING: 21 (ref 28–44)
INTERVENTRICULAR SEPTUM IN DIASTOLE (PARASTERNAL SHORT AXIS VIEW): 1.3 CM
INTERVENTRICULAR SEPTUM: 1.3 CM (ref 0.6–1.1)
LAAS-AP2: 14.6 CM2
LAAS-AP4: 17 CM2
LEFT ATRIUM SIZE: 3.9 CM
LEFT ATRIUM VOLUME (MOD BIPLANE): 35 ML
LEFT ATRIUM VOLUME INDEX (MOD BIPLANE): 13 ML/M2
LEFT INTERNAL DIMENSION IN SYSTOLE: 3.3 CM (ref 2.1–4)
LEFT VENTRICULAR INTERNAL DIMENSION IN DIASTOLE: 4.2 CM (ref 3.5–6)
LEFT VENTRICULAR POSTERIOR WALL IN END DIASTOLE: 1.3 CM
LEFT VENTRICULAR STROKE VOLUME: 34 ML
LV EF US.2D.A4C+ESTIMATED: 61 %
LVSV (TEICH): 34 ML
MV E'TISSUE VEL-LAT: 13 CM/S
MV E'TISSUE VEL-SEP: 9 CM/S
MV PEAK A VEL: 0.6 M/S
MV PEAK E VEL: 73 CM/S
RIGHT ATRIUM AREA SYSTOLE A4C: 13.4 CM2
RIGHT VENTRICLE ID DIMENSION: 3 CM
SINOTUBULAR JUNCTION: 2.9 CM
SL CV LEFT ATRIUM LENGTH A2C: 5.7 CM
SL CV PED ECHO LEFT VENTRICLE DIASTOLIC VOLUME (MOD BIPLANE) 2D: 79 ML
SL CV PED ECHO LEFT VENTRICLE SYSTOLIC VOLUME (MOD BIPLANE) 2D: 45 ML
SL CV SINUS OF VALSALVA 2D: 3.3 CM
STJ: 2.9 CM
TR MAX PG: 21 MMHG
TR PEAK VELOCITY: 2.3 M/S
TRICUSPID VALVE PEAK REGURGITATION VELOCITY: 2.31 M/S

## 2025-03-20 PROCEDURE — 93306 TTE W/DOPPLER COMPLETE: CPT | Performed by: INTERNAL MEDICINE

## 2025-03-20 PROCEDURE — 93306 TTE W/DOPPLER COMPLETE: CPT

## 2025-03-20 PROCEDURE — 93226 XTRNL ECG REC<48 HR SCAN A/R: CPT

## 2025-03-20 PROCEDURE — 93225 XTRNL ECG REC<48 HRS REC: CPT

## 2025-03-25 DIAGNOSIS — I05.8 MITRAL VALVE SCLEROSIS: ICD-10-CM

## 2025-03-25 DIAGNOSIS — I51.7 LVH (LEFT VENTRICULAR HYPERTROPHY): Primary | ICD-10-CM

## 2025-03-25 DIAGNOSIS — R55 SYNCOPE AND COLLAPSE: ICD-10-CM

## 2025-04-01 ENCOUNTER — OFFICE VISIT (OUTPATIENT)
Dept: FAMILY MEDICINE CLINIC | Facility: CLINIC | Age: 45
End: 2025-04-01
Payer: COMMERCIAL

## 2025-04-01 VITALS
HEIGHT: 77 IN | HEART RATE: 85 BPM | WEIGHT: 298 LBS | OXYGEN SATURATION: 99 % | DIASTOLIC BLOOD PRESSURE: 86 MMHG | TEMPERATURE: 97.1 F | BODY MASS INDEX: 35.19 KG/M2 | SYSTOLIC BLOOD PRESSURE: 140 MMHG

## 2025-04-01 DIAGNOSIS — I10 PRIMARY HYPERTENSION: Primary | ICD-10-CM

## 2025-04-01 DIAGNOSIS — I51.7 LVH (LEFT VENTRICULAR HYPERTROPHY): ICD-10-CM

## 2025-04-01 PROCEDURE — 99214 OFFICE O/P EST MOD 30 MIN: CPT

## 2025-04-01 RX ORDER — LISINOPRIL 20 MG/1
20 TABLET ORAL DAILY
Qty: 30 TABLET | Refills: 1 | Status: SHIPPED | OUTPATIENT
Start: 2025-04-01

## 2025-04-01 NOTE — PROGRESS NOTES
Name: Amari Talbert      : 1980      MRN: [id number]  Encounter Provider: Monik Pinzon PA-C  Encounter Date: 2025   Encounter department: [facility]  :  Assessment & Plan  Primary hypertension  Blood pressure is mildly elevated based on today's examination as well as his home readings.  Will increase lisinopril to 20 mg.  Patient has not noted any side effects with lisinopril and has been compliant with the medication.  Will increase to 20 mg and have patient return to care in about 2 to 3 weeks for blood pressure recheck.  He is agreeable with this plan today.  Orders:  •  lisinopril (ZESTRIL) 20 mg tablet; Take 1 tablet (20 mg total) by mouth daily    LVH (left ventricular hypertrophy)  Reviewed echo results with patient today, cardiology referral is pending.  Patient will be calling to schedule a cardiology within the next few weeks.  Discussed with him proper blood pressure control is necessary with finding of mild left ventricular hypertrophy.  Holter monitor was overall normal with no major findings.  No further syncopal episodes.  Orders:  •  lisinopril (ZESTRIL) 20 mg tablet; Take 1 tablet (20 mg total) by mouth daily           History of Present Illness   Patient presents today for blood pressure follow-up.  He would also like to review his echo and Holter results.  Patient has had no further lightheadedness, dizziness or syncopal episodes.  Patient has no new concerns or symptoms today.  He has been compliant with lisinopril with no side effects.  He has been taking his blood pressure at home which is running in the 140-150s/80-90 range.  He has been trying to get an appointment with cardiology and is looking for provider suggestions today.      Review of Systems   Constitutional:  Negative for chills, fatigue and fever.   Respiratory:  Negative for cough, chest tightness and shortness of breath.    Cardiovascular:  Negative for chest pain, palpitations and leg  "swelling.   Neurological:  Negative for dizziness, light-headedness and headaches.       Objective   /86   Pulse 85   Temp (!) 97.1 °F (36.2 °C)   Ht 6' 5\" (1.956 m)   Wt 135 kg (298 lb)   SpO2 99%   BMI 35.34 kg/m²      Physical Exam  Vitals and nursing note reviewed.   Constitutional:       General: He is not in acute distress.     Appearance: Normal appearance. He is normal weight. He is not ill-appearing.   HENT:      Head: Normocephalic and atraumatic.   Cardiovascular:      Rate and Rhythm: Normal rate and regular rhythm.      Pulses: Normal pulses.      Heart sounds: No murmur heard.  Pulmonary:      Effort: Pulmonary effort is normal. No respiratory distress.      Breath sounds: Normal breath sounds.   Neurological:      General: No focal deficit present.      Mental Status: He is alert and oriented to person, place, and time. Mental status is at baseline.   Psychiatric:         Mood and Affect: Mood normal.         Behavior: Behavior normal.         Judgment: Judgment normal.         "

## 2025-04-01 NOTE — ASSESSMENT & PLAN NOTE
Blood pressure is mildly elevated based on today's examination as well as his home readings.  Will increase lisinopril to 20 mg.  Patient has not noted any side effects with lisinopril and has been compliant with the medication.  Will increase to 20 mg and have patient return to care in about 2 to 3 weeks for blood pressure recheck.  He is agreeable with this plan today.  Orders:  •  lisinopril (ZESTRIL) 20 mg tablet; Take 1 tablet (20 mg total) by mouth daily

## 2025-04-03 ENCOUNTER — TELEPHONE (OUTPATIENT)
Dept: CARDIOLOGY CLINIC | Facility: CLINIC | Age: 45
End: 2025-04-03

## 2025-04-29 ENCOUNTER — OFFICE VISIT (OUTPATIENT)
Dept: FAMILY MEDICINE CLINIC | Facility: CLINIC | Age: 45
End: 2025-04-29
Payer: COMMERCIAL

## 2025-04-29 VITALS
WEIGHT: 298 LBS | DIASTOLIC BLOOD PRESSURE: 86 MMHG | BODY MASS INDEX: 35.19 KG/M2 | SYSTOLIC BLOOD PRESSURE: 120 MMHG | OXYGEN SATURATION: 97 % | HEIGHT: 77 IN | HEART RATE: 80 BPM | TEMPERATURE: 98.4 F

## 2025-04-29 DIAGNOSIS — Z12.11 COLON CANCER SCREENING: ICD-10-CM

## 2025-04-29 DIAGNOSIS — I77.811 AORTIC ECTASIA, ABDOMINAL (HCC): ICD-10-CM

## 2025-04-29 DIAGNOSIS — I10 PRIMARY HYPERTENSION: ICD-10-CM

## 2025-04-29 DIAGNOSIS — Z00.00 ADULT GENERAL MEDICAL EXAMINATION: Primary | ICD-10-CM

## 2025-04-29 DIAGNOSIS — I51.7 LVH (LEFT VENTRICULAR HYPERTROPHY): ICD-10-CM

## 2025-04-29 PROBLEM — R82.90 ABNORMAL URINALYSIS: Status: RESOLVED | Noted: 2022-11-30 | Resolved: 2025-04-29

## 2025-04-29 PROBLEM — F41.1 ANXIETY IN ACUTE STRESS REACTION: Status: RESOLVED | Noted: 2022-11-16 | Resolved: 2025-04-29

## 2025-04-29 PROBLEM — Z13.31 POSITIVE DEPRESSION SCREENING: Status: RESOLVED | Noted: 2022-11-16 | Resolved: 2025-04-29

## 2025-04-29 PROBLEM — F43.0 ANXIETY IN ACUTE STRESS REACTION: Status: RESOLVED | Noted: 2022-11-16 | Resolved: 2025-04-29

## 2025-04-29 PROCEDURE — 99396 PREV VISIT EST AGE 40-64: CPT

## 2025-04-29 RX ORDER — LISINOPRIL 20 MG/1
20 TABLET ORAL DAILY
Qty: 90 TABLET | Refills: 1 | Status: SHIPPED | OUTPATIENT
Start: 2025-04-29

## 2025-04-29 NOTE — ASSESSMENT & PLAN NOTE
Blood pressure in office today 120/86, based on patient's previous readings I find this to be well-controlled with no further changes needed.  Will continue lisinopril 20 mg daily.  Continue follow-up with cardiology as well.  Continue regular home checks and if remain above 140/90 consistently, patient to reach out.  Otherwise follow-up in 6 months.  Orders:  •  lisinopril (ZESTRIL) 20 mg tablet; Take 1 tablet (20 mg total) by mouth daily

## 2025-04-29 NOTE — ASSESSMENT & PLAN NOTE
"Recent echo which showed \"Ectasia of ascending aorta measuring 4.0 cm.\"  No imaging able to be compared.  Patient saw [facility] cardiology who recommended CT scan for further evaluation, this is scheduled for May.  Discussed depending on size of ectasia, they may recommend monitoring size of this yearly to ensure it does not enlarge or change over time.     "

## 2025-04-29 NOTE — PROGRESS NOTES
Adult Annual Physical  Name: Amari Talbert      : 1980      MRN: [id number]  Encounter Provider: Monik Pinzon PA-C  Encounter Date: 2025   Encounter department: [facility]    :  Assessment & Plan  Adult general medical examination  Patient presents today for an annual physical. Patient's medical history and medication list were reviewed and updated. Annual physical questionnaire was given to review current diet, exercise level, sleep health, dental health, visual health, hearing status.     Preventative health needs were reviewed and assessed today:   Immunizations:   Flu -defers  COVID -defers   Tdap - up-to-date    Colon cancer screening-agreeable with Cologuard today after options were discussed    Physical examination unremarkable today, patient appears to be in good health for his age with stable vital signs.  Medical conditions are well-controlled.  Now follows with cardiology for regular surveillance.  Encouraged healthy diet and exercise as well as regular vision and dental appointments.    Follow-up in 6 months for blood pressure management, sooner with any acute concerns.       Primary hypertension  Blood pressure in office today 120/86, based on patient's previous readings I find this to be well-controlled with no further changes needed.  Will continue lisinopril 20 mg daily.  Continue follow-up with cardiology as well.  Continue regular home checks and if remain above 140/90 consistently, patient to reach out.  Otherwise follow-up in 6 months.  Orders:  •  lisinopril (ZESTRIL) 20 mg tablet; Take 1 tablet (20 mg total) by mouth daily    LVH (left ventricular hypertrophy)  Reviewed echo results with patient again.  Patient had mild concentric left ventricular hypertrophy likely from longstanding hypertension.  Hypertension now well-controlled with lisinopril 20 mg.  Orders:  •  lisinopril (ZESTRIL) 20 mg tablet; Take 1 tablet (20 mg total) by mouth daily    Aortic  "ectasia, abdominal (HCC)  Recent echo which showed \"Ectasia of ascending aorta measuring 4.0 cm.\"  No imaging able to be compared.  Patient saw [facility] cardiology who recommended CT scan for further evaluation, this is scheduled for May.  Discussed depending on size of ectasia, they may recommend monitoring size of this yearly to ensure it does not enlarge or change over time.       Colon cancer screening    Orders:  •  Cologuard        Preventive Screenings:  - Diabetes Screening: screening up-to-date  - Cholesterol Screening: screening up-to-date   - Hepatitis C screening: screening up-to-date   - HIV screening: screening up-to-date   - Colon cancer screening: orders placed and risks/benefits discussed   - Lung cancer screening: screening not indicated   - Prostate cancer screening: screening not indicated     Counseling/Anticipatory Guidance:  - Alcohol: discussed moderation in alcohol intake and recommendations for healthy alcohol use.   - Dental health: discussed importance of regular tooth brushing, flossing, and dental visits.   - Diet: discussed recommendations for a healthy/well-balanced diet.   - Exercise: the importance of regular exercise/physical activity was discussed. Recommend exercise 3-5 times per week for at least 30 minutes.          History of Present Illness     Adult Annual Physical:  Patient presents for annual physical. Has been feeling well.  Established care with [facility] cardiology.  Has a CT scan of the chest planned for May.  Has been doing well with lisinopril with no side effects.  Reports home blood pressures are running in the 130-140/70-80 range..     Diet and Physical Activity:  - Diet/Nutrition: well balanced diet, consuming 3-5 servings of fruits/vegetables daily, adequate whole grain intake and adequate fiber intake.  - Exercise: walking.    General Health:  - Sleep: sleeps well and 7-8 hours of sleep on average.  - Hearing: normal hearing bilateral ears.  - Vision: wears " "glasses and most recent eye exam < 1 year ago.  - Dental: regular dental visits and brushes teeth twice daily.     Health:  - History of STDs: no.   - Urinary symptoms: none.     Review of Systems   Constitutional:  Negative for chills and fever.   HENT:  Negative for congestion, sinus pressure, sore throat and trouble swallowing.    Respiratory:  Negative for cough, chest tightness and shortness of breath.    Cardiovascular:  Negative for chest pain, palpitations and leg swelling.   Gastrointestinal:  Negative for abdominal pain, diarrhea, nausea and vomiting.   Genitourinary:  Negative for difficulty urinating, dysuria and hematuria.   Musculoskeletal:  Negative for arthralgias, back pain and myalgias.   Neurological:  Negative for dizziness, seizures, syncope, light-headedness and headaches.   Hematological:  Does not bruise/bleed easily.   Psychiatric/Behavioral:  Negative for behavioral problems and confusion. The patient is not nervous/anxious.    All other systems reviewed and are negative.        Objective   /86   Pulse 80   Temp 98.4 °F (36.9 °C)   Ht 6' 5\" (1.956 m)   Wt 135 kg (298 lb)   SpO2 97%   BMI 35.34 kg/m²     Physical Exam  Vitals and nursing note reviewed.   Constitutional:       General: He is not in acute distress.     Appearance: Normal appearance. He is normal weight. He is not ill-appearing.   HENT:      Head: Normocephalic and atraumatic.      Right Ear: Tympanic membrane normal.      Left Ear: Tympanic membrane normal.      Nose: Nose normal.      Mouth/Throat:      Mouth: Mucous membranes are moist.      Pharynx: Oropharynx is clear. No oropharyngeal exudate or posterior oropharyngeal erythema.   Eyes:      Extraocular Movements: Extraocular movements intact.      Pupils: Pupils are equal, round, and reactive to light.   Neck:      Vascular: No carotid bruit.   Cardiovascular:      Rate and Rhythm: Normal rate and regular rhythm.      Pulses: Normal pulses.      Heart " sounds: No murmur heard.  Pulmonary:      Effort: Pulmonary effort is normal. No respiratory distress.      Breath sounds: Normal breath sounds.   Musculoskeletal:         General: Normal range of motion.      Cervical back: Normal range of motion and neck supple.      Right lower leg: No edema.      Left lower leg: No edema.   Lymphadenopathy:      Cervical: No cervical adenopathy.   Neurological:      General: No focal deficit present.      Mental Status: He is alert and oriented to person, place, and time. Mental status is at baseline.   Psychiatric:         Mood and Affect: Mood normal.         Behavior: Behavior normal.         Judgment: Judgment normal.

## 2025-05-10 LAB — COLOGUARD RESULT REPORTABLE: NEGATIVE

## 2025-05-11 ENCOUNTER — RESULTS FOLLOW-UP (OUTPATIENT)
Dept: FAMILY MEDICINE CLINIC | Facility: CLINIC | Age: 45
End: 2025-05-11

## 2025-06-05 ENCOUNTER — VBI (OUTPATIENT)
Dept: ADMINISTRATIVE | Facility: OTHER | Age: 45
End: 2025-06-05

## 2025-06-05 NOTE — TELEPHONE ENCOUNTER
06/05/25 1:38 PM     Chart reviewed for [facility]: Cologuard was/were submitted to the patient's insurance.     Obi Lanza MA   [facility]

## (undated) DEVICE — BASIC PACK: Brand: CONVERTORS

## (undated) DEVICE — TELFA NON-ADHERENT ABSORBENT DRESSING: Brand: TELFA

## (undated) DEVICE — LIGHT GLOVE GREEN

## (undated) DEVICE — 3M™ DURAPORE™ SURGICAL TAPE 1538-3, 3 INCH X 10 YARD (7,5CM X 9,1M), 4 ROLLS/BOX: Brand: 3M™ DURAPORE™

## (undated) DEVICE — NEEDLE 25G X 1 1/2

## (undated) DEVICE — SPONGE STICK WITH PVP-I: Brand: KENDALL

## (undated) DEVICE — SPONGE 4 X 4 XRAY 16 PLY STRL LF RFD

## (undated) DEVICE — STERILE POLYISOPRENE POWDER-FREE SURGICAL GLOVES: Brand: PROTEXIS

## (undated) DEVICE — YANKAUER,OPEN TIP, NO VENT: Brand: ARGYLE

## (undated) DEVICE — STERILE POLYISOPRENE POWDER-FREE SURGICAL GLOVES WITH EMOLLIENT COATING: Brand: PROTEXIS

## (undated) DEVICE — TUBING SUCTION 5MM X 12 FT

## (undated) DEVICE — SYRINGE 50ML LL

## (undated) DEVICE — NEEDLE BLUNT 18 G X 1 1/2IN

## (undated) DEVICE — 1820 FOAM BLOCK NEEDLE COUNTER: Brand: DEVON

## (undated) DEVICE — MINOR PROCEDURE DRAPE: Brand: CONVERTORS

## (undated) DEVICE — 4-PORT MANIFOLD: Brand: NEPTUNE 2

## (undated) DEVICE — INTENDED FOR TISSUE SEPARATION, AND OTHER PROCEDURES THAT REQUIRE A SHARP SURGICAL BLADE TO PUNCTURE OR CUT.: Brand: BARD-PARKER SAFETY BLADES SIZE 10, STERILE

## (undated) DEVICE — SKIN MARKER DUAL TIP WITH RULER CAP, FLEXIBLE RULER AND LABELS: Brand: DEVON

## (undated) DEVICE — SURGICEL 2 X 3

## (undated) DEVICE — BULB SYRINGE,IRRIGATION WITH PROTECTIVE CAP: Brand: DOVER

## (undated) DEVICE — SYRINGE 10ML LL CONTROL TOP

## (undated) DEVICE — PENCIL ELECTROSURG E-Z CLEAN -0035H

## (undated) DEVICE — UNDERGLOVE PROTEXIS  BLUE SZ 7